# Patient Record
Sex: MALE | Race: WHITE | NOT HISPANIC OR LATINO | Employment: UNEMPLOYED | URBAN - METROPOLITAN AREA
[De-identification: names, ages, dates, MRNs, and addresses within clinical notes are randomized per-mention and may not be internally consistent; named-entity substitution may affect disease eponyms.]

---

## 2020-01-08 ENCOUNTER — HOSPITAL ENCOUNTER (EMERGENCY)
Facility: HOSPITAL | Age: 25
Discharge: PSYCHIATRIC OR SUBSTANCE USE FACILITY | End: 2020-01-08
Attending: EMERGENCY MEDICINE
Payer: MEDICARE

## 2020-01-08 VITALS
OXYGEN SATURATION: 100 % | WEIGHT: 200 LBS | RESPIRATION RATE: 18 BRPM | BODY MASS INDEX: 27.09 KG/M2 | DIASTOLIC BLOOD PRESSURE: 67 MMHG | HEART RATE: 86 BPM | TEMPERATURE: 98 F | SYSTOLIC BLOOD PRESSURE: 129 MMHG | HEIGHT: 72 IN

## 2020-01-08 DIAGNOSIS — F25.9 SCHIZOAFFECTIVE DISORDER, UNSPECIFIED TYPE: ICD-10-CM

## 2020-01-08 DIAGNOSIS — R45.850 HOMICIDAL IDEATION: Primary | ICD-10-CM

## 2020-01-08 LAB
ALBUMIN SERPL BCP-MCNC: 4.3 G/DL (ref 3.5–5.2)
ALP SERPL-CCNC: 65 U/L (ref 55–135)
ALT SERPL W/O P-5'-P-CCNC: 71 U/L (ref 10–44)
AMPHET+METHAMPHET UR QL: NORMAL
ANION GAP SERPL CALC-SCNC: 15 MMOL/L (ref 8–16)
APAP SERPL-MCNC: <3 UG/ML (ref 10–20)
AST SERPL-CCNC: 43 U/L (ref 10–40)
BACTERIA #/AREA URNS AUTO: NORMAL /HPF
BARBITURATES UR QL SCN>200 NG/ML: NEGATIVE
BASOPHILS # BLD AUTO: 0.07 K/UL (ref 0–0.2)
BASOPHILS NFR BLD: 0.4 % (ref 0–1.9)
BENZODIAZ UR QL SCN>200 NG/ML: NEGATIVE
BILIRUB SERPL-MCNC: 0.8 MG/DL (ref 0.1–1)
BILIRUB UR QL STRIP: NEGATIVE
BUN SERPL-MCNC: 21 MG/DL (ref 6–20)
BZE UR QL SCN: NORMAL
CALCIUM SERPL-MCNC: 9.6 MG/DL (ref 8.7–10.5)
CANNABINOIDS UR QL SCN: NORMAL
CHLORIDE SERPL-SCNC: 102 MMOL/L (ref 95–110)
CLARITY UR REFRACT.AUTO: CLEAR
CO2 SERPL-SCNC: 21 MMOL/L (ref 23–29)
COLOR UR AUTO: YELLOW
CREAT SERPL-MCNC: 1 MG/DL (ref 0.5–1.4)
CREAT UR-MCNC: 232 MG/DL (ref 23–375)
DIFFERENTIAL METHOD: ABNORMAL
EOSINOPHIL # BLD AUTO: 0.1 K/UL (ref 0–0.5)
EOSINOPHIL NFR BLD: 0.5 % (ref 0–8)
ERYTHROCYTE [DISTWIDTH] IN BLOOD BY AUTOMATED COUNT: 13.8 % (ref 11.5–14.5)
EST. GFR  (AFRICAN AMERICAN): >60 ML/MIN/1.73 M^2
EST. GFR  (NON AFRICAN AMERICAN): >60 ML/MIN/1.73 M^2
ETHANOL SERPL-MCNC: <10 MG/DL
GLUCOSE SERPL-MCNC: 102 MG/DL (ref 70–110)
GLUCOSE UR QL STRIP: NEGATIVE
HCT VFR BLD AUTO: 44.6 % (ref 40–54)
HGB BLD-MCNC: 14 G/DL (ref 14–18)
HGB UR QL STRIP: NEGATIVE
IMM GRANULOCYTES # BLD AUTO: 0.07 K/UL (ref 0–0.04)
IMM GRANULOCYTES NFR BLD AUTO: 0.4 % (ref 0–0.5)
KETONES UR QL STRIP: ABNORMAL
LEUKOCYTE ESTERASE UR QL STRIP: NEGATIVE
LYMPHOCYTES # BLD AUTO: 5.1 K/UL (ref 1–4.8)
LYMPHOCYTES NFR BLD: 27.5 % (ref 18–48)
MCH RBC QN AUTO: 25.7 PG (ref 27–31)
MCHC RBC AUTO-ENTMCNC: 31.4 G/DL (ref 32–36)
MCV RBC AUTO: 82 FL (ref 82–98)
METHADONE UR QL SCN>300 NG/ML: NEGATIVE
MICROSCOPIC COMMENT: NORMAL
MONOCYTES # BLD AUTO: 1.3 K/UL (ref 0.3–1)
MONOCYTES NFR BLD: 7 % (ref 4–15)
NEUTROPHILS # BLD AUTO: 12 K/UL (ref 1.8–7.7)
NEUTROPHILS NFR BLD: 64.2 % (ref 38–73)
NITRITE UR QL STRIP: NEGATIVE
NRBC BLD-RTO: 0 /100 WBC
OPIATES UR QL SCN: NEGATIVE
PCP UR QL SCN>25 NG/ML: NEGATIVE
PH UR STRIP: 5 [PH] (ref 5–8)
PLATELET # BLD AUTO: 215 K/UL (ref 150–350)
PMV BLD AUTO: 12.1 FL (ref 9.2–12.9)
POTASSIUM SERPL-SCNC: 4.1 MMOL/L (ref 3.5–5.1)
PROT SERPL-MCNC: 8 G/DL (ref 6–8.4)
PROT UR QL STRIP: NEGATIVE
RBC # BLD AUTO: 5.44 M/UL (ref 4.6–6.2)
RBC #/AREA URNS AUTO: 2 /HPF (ref 0–4)
SODIUM SERPL-SCNC: 138 MMOL/L (ref 136–145)
SP GR UR STRIP: 1.03 (ref 1–1.03)
TOXICOLOGY INFORMATION: NORMAL
TSH SERPL DL<=0.005 MIU/L-ACNC: 1.16 UIU/ML (ref 0.4–4)
URN SPEC COLLECT METH UR: ABNORMAL
WBC # BLD AUTO: 18.62 K/UL (ref 3.9–12.7)
WBC #/AREA URNS AUTO: 0 /HPF (ref 0–5)

## 2020-01-08 PROCEDURE — 85025 COMPLETE CBC W/AUTO DIFF WBC: CPT

## 2020-01-08 PROCEDURE — 80307 DRUG TEST PRSMV CHEM ANLYZR: CPT

## 2020-01-08 PROCEDURE — 80053 COMPREHEN METABOLIC PANEL: CPT

## 2020-01-08 PROCEDURE — 99285 PR EMERGENCY DEPT VISIT,LEVEL V: ICD-10-PCS | Mod: ,,, | Performed by: EMERGENCY MEDICINE

## 2020-01-08 PROCEDURE — 81001 URINALYSIS AUTO W/SCOPE: CPT | Mod: 59

## 2020-01-08 PROCEDURE — 80320 DRUG SCREEN QUANTALCOHOLS: CPT

## 2020-01-08 PROCEDURE — 80329 ANALGESICS NON-OPIOID 1 OR 2: CPT

## 2020-01-08 PROCEDURE — 99285 EMERGENCY DEPT VISIT HI MDM: CPT | Mod: ,,, | Performed by: EMERGENCY MEDICINE

## 2020-01-08 PROCEDURE — 99285 EMERGENCY DEPT VISIT HI MDM: CPT | Mod: 25

## 2020-01-08 PROCEDURE — 84443 ASSAY THYROID STIM HORMONE: CPT

## 2020-01-08 PROCEDURE — 25000003 PHARM REV CODE 250: Performed by: EMERGENCY MEDICINE

## 2020-01-08 RX ORDER — TALC
6 POWDER (GRAM) TOPICAL NIGHTLY PRN
Status: DISCONTINUED | OUTPATIENT
Start: 2020-01-08 | End: 2020-01-08 | Stop reason: HOSPADM

## 2020-01-08 RX ADMIN — Medication 6 MG: at 03:01

## 2020-01-08 NOTE — ED PROVIDER NOTES
"Source of History:  Patient, OPC, mother    Chief complaint:  Psychiatric Evaluation (brought in with [facility] with OPC. per OPC states patient was threatening to kill mother and sibling. pt denies SI/HI at present. pt states he went voluntarily to [facility] but left because "wait was too long")    HPI:  Zack Bhatia is a 24 y.o. male with history of substance abuse, schizoaffective disorder, opioid abuse, impulse control disorder presenting to emergency department under OPC order for threatening to kill his mother.    I spoke with the patient's mother by phone.  She states that this evening the patient busted into the house where he is not supposed to be, stating I am going to kill you.  He also threatened to kill his little brother.  Patient's mother states that he has been acting erratically.  He missed his December injection of medication due to having the flu.  Per the OPC, he abuses marijuana, synthetic marijuana.     At this time the patient has no complaints except that he states that he is tired.  He denies wanting to hurt himself or others, denies any hallucinations, denies paranoia.  He states that he has been using drugs but states I prefer not to stay what.    ROS: As per HPI and below:  Review of Systems   Constitutional: Negative for fever.   HENT: Negative for sore throat.    Eyes: Negative for double vision.   Respiratory: Negative for cough and shortness of breath.    Cardiovascular: Negative for chest pain.   Gastrointestinal: Negative for abdominal pain and vomiting.   Genitourinary: Negative for dysuria.   Musculoskeletal: Negative for falls.   Skin: Negative for rash.   Neurological: Negative for headaches.   Psychiatric/Behavioral: Positive for substance abuse.     Review of patient's allergies indicates:   Allergen Reactions    Thorazine [chlorpromazine]      "get overheated when I go outside after taking"       No current facility-administered medications on file prior to encounter.  "     Current Outpatient Medications on File Prior to Encounter   Medication Sig Dispense Refill    benztropine (COGENTIN) 0.5 MG tablet Take 0.5 mg by mouth 2 (two) times daily.      chlorproMAZINE (THORAZINE) 200 MG tablet Take 200 mg by mouth 3 (three) times daily.      divalproex (DEPAKOTE) 125 MG EC tablet Take 125 mg by mouth 3 (three) times daily.      haloperidol (HALDOL) 10 MG tablet Take 10 mg by mouth 4 (four) times daily.      hydrOXYzine (VISTARIL) 100 MG capsule Take 100 mg by mouth 4 (four) times daily.      lithium carbonate 150 MG capsule Take 300 mg by mouth 3 (three) times daily with meals.       quetiapine (SEROQUEL) 400 MG tablet Take 1 tablet (400 mg total) by mouth every evening. 30 tablet 1    risperidone (RISPERDAL) 1 MG tablet Take 1 mg by mouth 2 (two) times daily.         PMH:  As per HPI and below:  Past Medical History:   Diagnosis Date    Bipolar 1 disorder     Hepatitis C     History of psychiatric care     History of psychiatric hospitalization     Psychiatric exam requested by authority     Psychiatric problem     Schizo affective schizophrenia     Therapy      Past Surgical History:   Procedure Laterality Date    ADENOIDECTOMY      EYE SURGERY      KNEE SURGERY      TONSILLECTOMY         Social History     Socioeconomic History    Marital status: Single     Spouse name: Not on file    Number of children: Not on file    Years of education: Not on file    Highest education level: Not on file   Occupational History    Not on file   Social Needs    Financial resource strain: Not on file    Food insecurity:     Worry: Not on file     Inability: Not on file    Transportation needs:     Medical: Not on file     Non-medical: Not on file   Tobacco Use    Smoking status: Current Every Day Smoker     Packs/day: 0.25     Years: 3.00     Pack years: 0.75     Types: Cigarettes   Substance and Sexual Activity    Alcohol use: No    Drug use: Yes     Types: Marijuana,  Cocaine     Comment: heroin    Sexual activity: Never   Lifestyle    Physical activity:     Days per week: Not on file     Minutes per session: Not on file    Stress: Not on file   Relationships    Social connections:     Talks on phone: Not on file     Gets together: Not on file     Attends Yarsani service: Not on file     Active member of club or organization: Not on file     Attends meetings of clubs or organizations: Not on file     Relationship status: Not on file   Other Topics Concern    Patient feels they ought to cut down on drinking/drug use No    Patient annoyed by others criticizing their drinking/drug use No    Patient has felt bad or guilty about drinking/drug use No    Patient has had a drink/used drugs as an eye opener in the AM No   Social History Narrative    Not on file       Family History   Problem Relation Age of Onset    Heart disease Mother     Diabetes Maternal Aunt        Physical Exam:    Vitals:    01/08/20 0030   BP: (!) 162/83   Pulse: 102   Resp: 18   Temp: 98.3 °F (36.8 °C)     Gen: No acute distress.  Mental Status:  Alert and oriented x 3.  Appropriate, conversant.  Skin: Warm, dry. No rashes seen.  Eyes: No conjunctival injection.  ENT: Oropharynx clear.    Pulm: Clear to auscultation bilaterally.  Good air movement.  No wheezes. No increased work of breathing.  CV: Regular rate. Regular rhythm. Normal peripheral perfusion. No lower extremity edema.  Abd: Soft.  Not distended.  Nontender.  No guarding.  No rebound.  MSK: Good range of motion all joints.  No deformities.    Neck supple.  No meningismus.  Neuro: Awake. Speech normal. No focal neuro deficit observed. Cranial nerves intact. Motor grossly intact.  Sensation grossly intact.  Cerebellar intact.      Laboratory Studies:  Labs Reviewed   CBC W/ AUTO DIFFERENTIAL - Abnormal; Notable for the following components:       Result Value    WBC 18.62 (*)     Mean Corpuscular Hemoglobin 25.7 (*)     Mean Corpuscular  Hemoglobin Conc 31.4 (*)     All other components within normal limits   COMPREHENSIVE METABOLIC PANEL - Abnormal; Notable for the following components:    CO2 21 (*)     BUN, Bld 21 (*)     AST 43 (*)     ALT 71 (*)     All other components within normal limits   URINALYSIS, REFLEX TO URINE CULTURE - Abnormal; Notable for the following components:    Ketones, UA 3+ (*)     All other components within normal limits    Narrative:     Preferred Collection Type->Urine, Clean Catch   ACETAMINOPHEN LEVEL - Abnormal; Notable for the following components:    Acetaminophen (Tylenol), Serum <3.0 (*)     All other components within normal limits   TSH   DRUG SCREEN PANEL, URINE EMERGENCY    Narrative:     Preferred Collection Type->Urine, Clean Catch   ALCOHOL,MEDICAL (ETHANOL)   URINALYSIS MICROSCOPIC    Narrative:     Preferred Collection Type->Urine, Clean Catch     Chart reviewed.  Psychiatric history requiring hospitalization in the past.    Imaging Results    None       Medications Given:  Medications - No data to display    MDM:    24 y.o. male brought to emergency department under OPC for threatening to kill his mother and brother.  He is afebrile, stable, nontoxic.  He is neuro intact.  No signs of trauma.    Psychiatric medical screening was completed, found have a leukocytosis which appears to be somewhat chronic and is likely reactive due to his polysubstance abuse.  His urine toxicology is positive for cocaine, amphetamines, and THC.    Additional workup is unremarkable.  Patient is medically clear for transfer to psychiatric hospital.    Diagnostic Impression:    1. Homicidal ideation    2. Schizoaffective disorder, unspecified type        Patient and/or family understands the plan and is in agreement, verbalized understanding, questions answered    Valeri Lopez MD  Emergency Medicine           Valeri Lopez MD  01/08/20 8646

## 2020-01-08 NOTE — ED NOTES
"Historian reports pt being seen at another facility tonight for a psych eval and was discharged. Pt's mother was not satisfied with that and contacted a  and the police and pt is here for another psych eval. Pt reports using recreational drugs tonight but refuses to say which ones. Pt states, "it's in the urine. I don't feel like talking about which ones." pt denies any SI or HI and denies any past attempts. No acute distress noted. Stable condition. Sitter and police at bedside.    "
Assumed pt care, pt is hyperactive but cooperative. Pt instructed on the PEC process and placement. Pt verbalizes understanding. Pt informed that he will be transferring [place] [facility]   
Bed: 20  Expected date: 1/8/20  Expected time: 12:28 AM  Means of arrival:   Comments:  OPC  
Nurse Fofana with [facility] given report.   
Patient transferred to [facility] with security and myself with 1 bag of belongings. Patient searched for contraband.  called and informed of transfer to [facility]. DVC maintained.   
Patient with erratic fast movements  . He denies SI, HI, V/A hallucinations. Behavior Bizarre . DVC maintained.   
visualized patient  
Soft, non-tender, no hepatosplenomegaly, normal bowel sounds
Soft, non-tender, no hepatosplenomegaly, normal bowel sounds

## 2020-02-10 ENCOUNTER — HOSPITAL ENCOUNTER (EMERGENCY)
Facility: HOSPITAL | Age: 25
Discharge: PSYCHIATRIC OR SUBSTANCE USE FACILITY | End: 2020-02-11
Attending: EMERGENCY MEDICINE
Payer: MEDICARE

## 2020-02-10 DIAGNOSIS — R45.851 SUICIDAL IDEATION: Primary | ICD-10-CM

## 2020-02-10 LAB
ALBUMIN SERPL BCP-MCNC: 4 G/DL (ref 3.5–5.2)
ALP SERPL-CCNC: 51 U/L (ref 55–135)
ALT SERPL W/O P-5'-P-CCNC: 56 U/L (ref 10–44)
AMPHET+METHAMPHET UR QL: NEGATIVE
ANION GAP SERPL CALC-SCNC: 11 MMOL/L (ref 8–16)
ANISOCYTOSIS BLD QL SMEAR: ABNORMAL
AST SERPL-CCNC: 34 U/L (ref 10–40)
BARBITURATES UR QL SCN>200 NG/ML: NEGATIVE
BASOPHILS # BLD AUTO: 0.06 K/UL (ref 0–0.2)
BASOPHILS NFR BLD: 0.4 % (ref 0–1.9)
BENZODIAZ UR QL SCN>200 NG/ML: NEGATIVE
BILIRUB SERPL-MCNC: 0.3 MG/DL (ref 0.1–1)
BILIRUB UR QL STRIP: NEGATIVE
BUN SERPL-MCNC: 23 MG/DL (ref 6–20)
BZE UR QL SCN: NEGATIVE
CALCIUM SERPL-MCNC: 9.2 MG/DL (ref 8.7–10.5)
CANNABINOIDS UR QL SCN: NORMAL
CHLORIDE SERPL-SCNC: 107 MMOL/L (ref 95–110)
CLARITY UR REFRACT.AUTO: CLEAR
CO2 SERPL-SCNC: 22 MMOL/L (ref 23–29)
COLOR UR AUTO: YELLOW
CREAT SERPL-MCNC: 0.9 MG/DL (ref 0.5–1.4)
CREAT UR-MCNC: 112 MG/DL (ref 23–375)
DIFFERENTIAL METHOD: ABNORMAL
EOSINOPHIL # BLD AUTO: 0.2 K/UL (ref 0–0.5)
EOSINOPHIL NFR BLD: 0.9 % (ref 0–8)
ERYTHROCYTE [DISTWIDTH] IN BLOOD BY AUTOMATED COUNT: 14.3 % (ref 11.5–14.5)
EST. GFR  (AFRICAN AMERICAN): >60 ML/MIN/1.73 M^2
EST. GFR  (NON AFRICAN AMERICAN): >60 ML/MIN/1.73 M^2
ETHANOL SERPL-MCNC: <10 MG/DL
GLUCOSE SERPL-MCNC: 99 MG/DL (ref 70–110)
GLUCOSE UR QL STRIP: NEGATIVE
HCT VFR BLD AUTO: 40.9 % (ref 40–54)
HGB BLD-MCNC: 13.1 G/DL (ref 14–18)
HGB UR QL STRIP: NEGATIVE
HYPOCHROMIA BLD QL SMEAR: ABNORMAL
IMM GRANULOCYTES # BLD AUTO: 0.05 K/UL (ref 0–0.04)
IMM GRANULOCYTES NFR BLD AUTO: 0.3 % (ref 0–0.5)
KETONES UR QL STRIP: NEGATIVE
LEUKOCYTE ESTERASE UR QL STRIP: NEGATIVE
LYMPHOCYTES # BLD AUTO: 4.8 K/UL (ref 1–4.8)
LYMPHOCYTES NFR BLD: 29.4 % (ref 18–48)
MCH RBC QN AUTO: 26 PG (ref 27–31)
MCHC RBC AUTO-ENTMCNC: 32 G/DL (ref 32–36)
MCV RBC AUTO: 81 FL (ref 82–98)
METHADONE UR QL SCN>300 NG/ML: NEGATIVE
MONOCYTES # BLD AUTO: 1.3 K/UL (ref 0.3–1)
MONOCYTES NFR BLD: 7.8 % (ref 4–15)
NEUTROPHILS # BLD AUTO: 9.9 K/UL (ref 1.8–7.7)
NEUTROPHILS NFR BLD: 61.2 % (ref 38–73)
NITRITE UR QL STRIP: NEGATIVE
NRBC BLD-RTO: 0 /100 WBC
OPIATES UR QL SCN: NEGATIVE
PCP UR QL SCN>25 NG/ML: NEGATIVE
PH UR STRIP: 6 [PH] (ref 5–8)
PLATELET # BLD AUTO: 193 K/UL (ref 150–350)
PLATELET BLD QL SMEAR: ABNORMAL
PMV BLD AUTO: 12.4 FL (ref 9.2–12.9)
POLYCHROMASIA BLD QL SMEAR: ABNORMAL
POTASSIUM SERPL-SCNC: 3.8 MMOL/L (ref 3.5–5.1)
PROT SERPL-MCNC: 7.3 G/DL (ref 6–8.4)
PROT UR QL STRIP: NEGATIVE
RBC # BLD AUTO: 5.03 M/UL (ref 4.6–6.2)
SODIUM SERPL-SCNC: 140 MMOL/L (ref 136–145)
SP GR UR STRIP: 1.02 (ref 1–1.03)
TOXIC GRANULES BLD QL SMEAR: PRESENT
TOXICOLOGY INFORMATION: NORMAL
TSH SERPL DL<=0.005 MIU/L-ACNC: 1.61 UIU/ML (ref 0.4–4)
URN SPEC COLLECT METH UR: NORMAL
WBC # BLD AUTO: 16.2 K/UL (ref 3.9–12.7)

## 2020-02-10 PROCEDURE — 85025 COMPLETE CBC W/AUTO DIFF WBC: CPT

## 2020-02-10 PROCEDURE — 80307 DRUG TEST PRSMV CHEM ANLYZR: CPT

## 2020-02-10 PROCEDURE — 99285 EMERGENCY DEPT VISIT HI MDM: CPT | Mod: ,,, | Performed by: EMERGENCY MEDICINE

## 2020-02-10 PROCEDURE — 81003 URINALYSIS AUTO W/O SCOPE: CPT | Mod: 59

## 2020-02-10 PROCEDURE — 99285 PR EMERGENCY DEPT VISIT,LEVEL V: ICD-10-PCS | Mod: ,,, | Performed by: EMERGENCY MEDICINE

## 2020-02-10 PROCEDURE — 80320 DRUG SCREEN QUANTALCOHOLS: CPT

## 2020-02-10 PROCEDURE — 80053 COMPREHEN METABOLIC PANEL: CPT

## 2020-02-10 PROCEDURE — 84443 ASSAY THYROID STIM HORMONE: CPT

## 2020-02-10 PROCEDURE — 25000003 PHARM REV CODE 250: Performed by: PHYSICIAN ASSISTANT

## 2020-02-10 PROCEDURE — 99285 EMERGENCY DEPT VISIT HI MDM: CPT

## 2020-02-10 RX ORDER — QUETIAPINE FUMARATE 200 MG/1
400 TABLET, FILM COATED ORAL
Status: COMPLETED | OUTPATIENT
Start: 2020-02-10 | End: 2020-02-10

## 2020-02-10 RX ORDER — ALPRAZOLAM 0.5 MG/1
0.5 TABLET ORAL
Status: COMPLETED | OUTPATIENT
Start: 2020-02-10 | End: 2020-02-10

## 2020-02-10 RX ORDER — QUETIAPINE FUMARATE 300 MG/1
300 TABLET, FILM COATED ORAL
Status: DISCONTINUED | OUTPATIENT
Start: 2020-02-10 | End: 2020-02-10

## 2020-02-10 RX ORDER — QUETIAPINE FUMARATE 200 MG/1
400 TABLET, FILM COATED ORAL
Status: DISCONTINUED | OUTPATIENT
Start: 2020-02-10 | End: 2020-02-10

## 2020-02-10 RX ADMIN — QUETIAPINE FUMARATE 400 MG: 200 TABLET ORAL at 09:02

## 2020-02-10 RX ADMIN — ALPRAZOLAM 0.5 MG: 0.5 TABLET ORAL at 08:02

## 2020-02-11 VITALS
DIASTOLIC BLOOD PRESSURE: 67 MMHG | WEIGHT: 202.38 LBS | HEART RATE: 88 BPM | SYSTOLIC BLOOD PRESSURE: 121 MMHG | OXYGEN SATURATION: 100 % | BODY MASS INDEX: 29.98 KG/M2 | TEMPERATURE: 98 F | HEIGHT: 69 IN | RESPIRATION RATE: 18 BRPM

## 2020-02-11 NOTE — ED NOTES
The room has been made safe for pt arrival. All wires, cords, bins and cleaning supplies have been removed from the room. Sitter is at the bedside with no personal belongings to provide direct visual observation and q15 minute checks. The patient has been searched for harmful objects and changed into paper scrubs as per protocol. Pt belongings have been removed from the room, labelled in a bag and secured in pt belongings closet. The pt has been given an explanation of pt rights while hospitalized. Pt has signed acknowledgement of those rights and a copy has been placed in the chart.

## 2020-02-11 NOTE — ED NOTES
Pt reports +SI after verbal altercation with mother. Pt calm and cooperative with depressed state. Pt denies HI, A/V hallucinations. Pt reports compliance with medications.

## 2020-02-11 NOTE — ED NOTES
Patient transferred to [facility] with ER nurse and security with belongings. Patient searched for contraband.  called and informed of transfer to  3. Spoke with Madelin with the . Patient denies SI, HI, V/A hallucinations. DVC maintained.

## 2020-02-11 NOTE — ED PROVIDER NOTES
"Encounter Date: 2/10/2020       History     Chief Complaint   Patient presents with    Suicidal     Pt states "I'm fighting with my mother and I'm suicidal. I missed my invega shot."       24-year-old male to the ER for evaluation of suicidal ideation.  The patient has a longstanding history of bipolar disorder and major depressive disorder.  The patient was recently hospitalized at [facility].  He presents to the ER tonight complaining of suicidal ideations stating  That he wants to kill himself after a fight with his mother.  He reports the last Invega injection was January 9th.  He reports being a his Seroquel and gabapentin.  He appears calm and cooperative.  There are no signs of bodily injury or trauma.        Review of patient's allergies indicates:   Allergen Reactions    Thorazine [chlorpromazine]      "get overheated when I go outside after taking"     Past Medical History:   Diagnosis Date    Bipolar 1 disorder     Hepatitis C     History of psychiatric care     History of psychiatric hospitalization     Psychiatric exam requested by authority     Psychiatric problem     Schizo affective schizophrenia     Therapy      Past Surgical History:   Procedure Laterality Date    ADENOIDECTOMY      EYE SURGERY      KNEE SURGERY      TONSILLECTOMY       Family History   Problem Relation Age of Onset    Heart disease Mother     Diabetes Maternal Aunt      Social History     Tobacco Use    Smoking status: Current Every Day Smoker     Packs/day: 0.25     Years: 3.00     Pack years: 0.75     Types: Cigarettes   Substance Use Topics    Alcohol use: No    Drug use: Yes     Types: Marijuana, Cocaine     Comment: heroin     Review of Systems   Constitutional: Negative for fever.   HENT: Negative for sore throat.    Respiratory: Negative for shortness of breath.    Cardiovascular: Negative for chest pain.   Gastrointestinal: Negative for nausea.   Genitourinary: Negative for " dysuria.   Musculoskeletal: Negative for back pain.   Skin: Negative for rash.   Neurological: Negative for weakness.   Hematological: Does not bruise/bleed easily.   Psychiatric/Behavioral: Positive for sleep disturbance and suicidal ideas. Negative for self-injury. The patient is nervous/anxious.        Physical Exam     Initial Vitals [02/10/20 2010]   BP Pulse Resp Temp SpO2   132/77 100 16 98.7 °F (37.1 °C) 96 %      MAP       --         Physical Exam    Constitutional: Vital signs are normal. He appears well-developed and well-nourished. He is not diaphoretic. No distress.   HENT:   Head: Normocephalic and atraumatic.   Right Ear: Hearing and external ear normal.   Left Ear: Hearing and external ear normal.   Eyes: Conjunctivae are normal.   Cardiovascular: Normal rate and regular rhythm. Exam reveals no gallop and no friction rub.    No murmur heard.  Pulmonary/Chest: No respiratory distress. He has no wheezes. He has no rhonchi. He has no rales. He exhibits no tenderness.   Abdominal: Soft. Normal appearance and bowel sounds are normal. He exhibits no distension and no mass. There is no tenderness. There is no rebound and no guarding.   Musculoskeletal: Normal range of motion.   Neurological: He is alert and oriented to person, place, and time.   Awake alert and oriented with appropriate mentation   Skin: Skin is warm and intact.   Psychiatric: His speech is normal and behavior is normal. Thought content is not paranoid and not delusional. Cognition and memory are normal. He exhibits a depressed mood. He expresses suicidal ideation. He expresses suicidal plans.         ED Course   Procedures  Labs Reviewed   CBC W/ AUTO DIFFERENTIAL - Abnormal; Notable for the following components:       Result Value    WBC 16.20 (*)     Hemoglobin 13.1 (*)     Mean Corpuscular Volume 81 (*)     Mean Corpuscular Hemoglobin 26.0 (*)     Gran # (ANC) 9.9 (*)     Immature Grans (Abs) 0.05 (*)     Mono # 1.3 (*)     All other  components within normal limits   COMPREHENSIVE METABOLIC PANEL - Abnormal; Notable for the following components:    CO2 22 (*)     BUN, Bld 23 (*)     Alkaline Phosphatase 51 (*)     ALT 56 (*)     All other components within normal limits   TSH   URINALYSIS, REFLEX TO URINE CULTURE    Narrative:     Preferred Collection Type->Urine, Clean Catch   ALCOHOL,MEDICAL (ETHANOL)   DRUG SCREEN PANEL, URINE EMERGENCY          Imaging Results    None          Medical Decision Making:   Initial Assessment:     24-year-old male with suicidal ideations  Differential Diagnosis:    Substance induced mood disorder, alcohol induced mood disorder, electrolyte derangement, suicidal, manic behavior  Clinical Tests:   Lab Tests: Ordered and Reviewed  ED Management:  24-year-old male presenting with suicidal ideations.  The patient has been medically cleared in the ER.  He has been placed under a PEC and will be transferred out for mental health placement and evaluation  Other:   I discussed test(s) with the performing physician.  I have discussed this case with another health care provider.                             Medically cleared for psychiatry placement: 2/10/2020 10:57 PM    Clinical Impression:       ICD-10-CM ICD-9-CM   1. Suicidal ideation R45.851 V62.84         Disposition:   Disposition: Transferred  Condition: Stable                     Tray Haines PA-C  02/10/20 2300

## 2020-02-11 NOTE — ED NOTES
Patient transferred to [facility] by SPD with 3 bags of belongings with Valuable envelope in 1 of the bags. Security present for transfer. Nurse Elaine with [facility] given report. Vital stable. PEC sent with Patient. DVC maintained. Patient did not want contact person called.

## 2020-03-17 ENCOUNTER — HOSPITAL ENCOUNTER (EMERGENCY)
Facility: HOSPITAL | Age: 25
Discharge: HOME | End: 2020-03-17
Attending: EMERGENCY MEDICINE
Payer: MEDICARE

## 2020-03-17 VITALS — TEMPERATURE: 98 F | DIASTOLIC BLOOD PRESSURE: 82 MMHG | SYSTOLIC BLOOD PRESSURE: 136 MMHG

## 2020-03-17 DIAGNOSIS — T50.901A ACCIDENTAL OVERDOSE, INITIAL ENCOUNTER: Primary | ICD-10-CM

## 2020-03-17 PROCEDURE — 99283 EMERGENCY DEPT VISIT LOW MDM: CPT

## 2020-03-17 RX ORDER — NALOXONE HYDROCHLORIDE 1 MG/ML
INJECTION INTRAMUSCULAR; INTRAVENOUS; SUBCUTANEOUS
Qty: 2 ML | Refills: 0 | Status: ON HOLD | OUTPATIENT
Start: 2020-03-17 | End: 2023-06-05 | Stop reason: HOSPADM

## 2020-03-17 NOTE — ED NOTES
Unable to assess patient, Pt spent time in the restroom and demanded to leave hospital immediately. PT discharged AMA by MD

## 2020-03-17 NOTE — ED PROVIDER NOTES
"Encounter Date: 3/17/2020    SCRIBE #1 NOTE: I, Calvin Castorena, am scribing for, and in the presence of, Jake Ramos MD.       History     Chief Complaint   Patient presents with    Drug Overdose     Patient was found behind a convenient store unresponsive.  Manfred WHITT administered 4 mg of Narcan.  Patient is awake and alert at this time.     24-year-old male brought to the emergency department by EMS after he was found unconscious.  Initial responders report patient was not breathing very much and was administered Narcan IM and intranasally.  They report good response.  On arrival, patient has no complaints.  Admits to using some heroin.  Denies any chest pain, shortness of breath, cough, congestion, fever, headache, lightheadedness, dizziness, nausea, vomiting, or any pain of any kind.    The history is provided by the patient and the EMS personnel.     Review of patient's allergies indicates:   Allergen Reactions    Thorazine [chlorpromazine]      "get overheated when I go outside after taking"     Past Medical History:   Diagnosis Date    Bipolar 1 disorder     Hepatitis C     History of psychiatric care     History of psychiatric hospitalization     Psychiatric exam requested by authority     Psychiatric problem     Schizo affective schizophrenia     Therapy      Past Surgical History:   Procedure Laterality Date    ADENOIDECTOMY      EYE SURGERY      KNEE SURGERY      TONSILLECTOMY       Family History   Problem Relation Age of Onset    Heart disease Mother     Diabetes Maternal Aunt      Social History     Tobacco Use    Smoking status: Current Every Day Smoker     Packs/day: 0.25     Years: 3.00     Pack years: 0.75     Types: Cigarettes   Substance Use Topics    Alcohol use: No    Drug use: Yes     Types: Marijuana, Cocaine     Comment: heroin     Review of Systems   Constitutional: Negative for chills, fatigue and fever.   HENT: Negative for facial swelling, trouble swallowing and voice " change.    Eyes: Negative for photophobia, pain and redness.   Respiratory: Negative for cough, choking and shortness of breath.    Cardiovascular: Negative for chest pain, palpitations and leg swelling.   Gastrointestinal: Negative for abdominal pain, diarrhea, nausea and vomiting.   Genitourinary: Negative for dysuria, frequency and urgency.   Musculoskeletal: Negative for back pain, neck pain and neck stiffness.   Neurological: Negative for seizures, speech difficulty, light-headedness, numbness and headaches.   All other systems reviewed and are negative.      Physical Exam     Initial Vitals   BP Pulse Resp Temp SpO2   -- -- -- -- --      MAP       --         Physical Exam    Nursing note and vitals reviewed.  Constitutional: He appears well-developed and well-nourished. No distress.   HENT:   Head: Normocephalic and atraumatic.   Oropharynx clear; Dry MM    Eyes: Conjunctivae and EOM are normal. Pupils are equal, round, and reactive to light.   Neck: Normal range of motion. Neck supple. No tracheal deviation present.   Cardiovascular: Normal rate, regular rhythm, normal heart sounds and intact distal pulses.   Pulmonary/Chest: Breath sounds normal. No respiratory distress. He has no wheezes. He has no rhonchi. He has no rales.   Abdominal: Soft. Bowel sounds are normal. He exhibits no distension. There is no tenderness.   Musculoskeletal: Normal range of motion. He exhibits no edema or tenderness.   Neurological: He is alert and oriented to person, place, and time. He has normal strength. No cranial nerve deficit.   Skin: Skin is warm and dry.         ED Course   Procedures  Labs Reviewed - No data to display       Imaging Results    None          Medical Decision Making:   Initial Assessment:   24-year-old male presents emergency department after an accidental heroin overdose  ED Management:  5:24 PM: Pt is A&O x3, appropriate and competent at this time. Pt voices desire to leave hospital. D/w pt in length  need for further evaluation and treatment due to HPI and PEx. Pt declines any further evaluation or tx at this time. All risks, including worsening sx, permanent bodily harm and death, were discussed in length. Pt acknowledges all risk at this time and agrees to sign AMA form. Pt given return to ER instructions. All questions and concerns addressed at this time. Pt leaving AMA.                                   Clinical Impression:       ICD-10-CM ICD-9-CM   1. Accidental overdose, initial encounter T50.901A 977.9     E858.9           Disposition:   Disposition: AMA         I, Dr. Jake Ramos, personally performed the services described in this documentation. All medical record entries made by the scribe were at my direction and in my presence.  I have reviewed the chart and agree that the record reflects my personal performance and is accurate and complete. Jake Ramos MD.  5:53 PM 03/17/2020                 Jake Ramos MD  03/17/20 [id number]

## 2020-06-20 ENCOUNTER — HOSPITAL ENCOUNTER (EMERGENCY)
Facility: HOSPITAL | Age: 25
Discharge: PSYCHIATRIC OR SUBSTANCE USE FACILITY | End: 2020-06-20
Attending: EMERGENCY MEDICINE
Payer: MEDICARE

## 2020-06-20 VITALS
RESPIRATION RATE: 18 BRPM | HEART RATE: 82 BPM | OXYGEN SATURATION: 96 % | TEMPERATURE: 99 F | SYSTOLIC BLOOD PRESSURE: 144 MMHG | DIASTOLIC BLOOD PRESSURE: 77 MMHG

## 2020-06-20 DIAGNOSIS — R45.851 SUICIDAL IDEATION: Primary | ICD-10-CM

## 2020-06-20 LAB
ALBUMIN SERPL BCP-MCNC: 3.7 G/DL (ref 3.5–5.2)
ALP SERPL-CCNC: 56 U/L (ref 55–135)
ALT SERPL W/O P-5'-P-CCNC: 45 U/L (ref 10–44)
AMPHET+METHAMPHET UR QL: NEGATIVE
ANION GAP SERPL CALC-SCNC: 8 MMOL/L (ref 8–16)
APAP SERPL-MCNC: <3 UG/ML (ref 10–20)
AST SERPL-CCNC: 28 U/L (ref 10–40)
BACTERIA #/AREA URNS AUTO: NORMAL /HPF
BARBITURATES UR QL SCN>200 NG/ML: NEGATIVE
BASOPHILS # BLD AUTO: 0.06 K/UL (ref 0–0.2)
BASOPHILS NFR BLD: 0.4 % (ref 0–1.9)
BENZODIAZ UR QL SCN>200 NG/ML: NEGATIVE
BILIRUB SERPL-MCNC: 0.2 MG/DL (ref 0.1–1)
BILIRUB UR QL STRIP: NEGATIVE
BUN SERPL-MCNC: 19 MG/DL (ref 6–20)
BZE UR QL SCN: NORMAL
CALCIUM SERPL-MCNC: 8.8 MG/DL (ref 8.7–10.5)
CANNABINOIDS UR QL SCN: NORMAL
CHLORIDE SERPL-SCNC: 107 MMOL/L (ref 95–110)
CLARITY UR REFRACT.AUTO: CLEAR
CO2 SERPL-SCNC: 22 MMOL/L (ref 23–29)
COLOR UR AUTO: YELLOW
CREAT SERPL-MCNC: 0.9 MG/DL (ref 0.5–1.4)
CREAT UR-MCNC: 167 MG/DL (ref 23–375)
DIFFERENTIAL METHOD: ABNORMAL
EOSINOPHIL # BLD AUTO: 0.1 K/UL (ref 0–0.5)
EOSINOPHIL NFR BLD: 1 % (ref 0–8)
ERYTHROCYTE [DISTWIDTH] IN BLOOD BY AUTOMATED COUNT: 14.1 % (ref 11.5–14.5)
EST. GFR  (AFRICAN AMERICAN): >60 ML/MIN/1.73 M^2
EST. GFR  (NON AFRICAN AMERICAN): >60 ML/MIN/1.73 M^2
ETHANOL SERPL-MCNC: <10 MG/DL
GLUCOSE SERPL-MCNC: 98 MG/DL (ref 70–110)
GLUCOSE UR QL STRIP: NEGATIVE
HCT VFR BLD AUTO: 40.3 % (ref 40–54)
HGB BLD-MCNC: 12.8 G/DL (ref 14–18)
HGB UR QL STRIP: NEGATIVE
IMM GRANULOCYTES # BLD AUTO: 0.03 K/UL (ref 0–0.04)
IMM GRANULOCYTES NFR BLD AUTO: 0.2 % (ref 0–0.5)
KETONES UR QL STRIP: NEGATIVE
LEUKOCYTE ESTERASE UR QL STRIP: NEGATIVE
LYMPHOCYTES # BLD AUTO: 4.9 K/UL (ref 1–4.8)
LYMPHOCYTES NFR BLD: 36.1 % (ref 18–48)
MCH RBC QN AUTO: 26 PG (ref 27–31)
MCHC RBC AUTO-ENTMCNC: 31.8 G/DL (ref 32–36)
MCV RBC AUTO: 82 FL (ref 82–98)
METHADONE UR QL SCN>300 NG/ML: NEGATIVE
MICROSCOPIC COMMENT: NORMAL
MONOCYTES # BLD AUTO: 1.4 K/UL (ref 0.3–1)
MONOCYTES NFR BLD: 10.5 % (ref 4–15)
NEUTROPHILS # BLD AUTO: 7 K/UL (ref 1.8–7.7)
NEUTROPHILS NFR BLD: 51.8 % (ref 38–73)
NITRITE UR QL STRIP: NEGATIVE
NRBC BLD-RTO: 0 /100 WBC
OPIATES UR QL SCN: NEGATIVE
PCP UR QL SCN>25 NG/ML: NEGATIVE
PH UR STRIP: 6 [PH] (ref 5–8)
PLATELET # BLD AUTO: 188 K/UL (ref 150–350)
PLATELET BLD QL SMEAR: ABNORMAL
PMV BLD AUTO: 12.8 FL (ref 9.2–12.9)
POTASSIUM SERPL-SCNC: 3.7 MMOL/L (ref 3.5–5.1)
PROT SERPL-MCNC: 6.8 G/DL (ref 6–8.4)
PROT UR QL STRIP: NEGATIVE
RBC # BLD AUTO: 4.93 M/UL (ref 4.6–6.2)
RBC #/AREA URNS AUTO: 1 /HPF (ref 0–4)
SARS-COV-2 RDRP RESP QL NAA+PROBE: NEGATIVE
SODIUM SERPL-SCNC: 137 MMOL/L (ref 136–145)
SP GR UR STRIP: 1.02 (ref 1–1.03)
TOXICOLOGY INFORMATION: NORMAL
TSH SERPL DL<=0.005 MIU/L-ACNC: 2.35 UIU/ML (ref 0.4–4)
URN SPEC COLLECT METH UR: NORMAL
WBC # BLD AUTO: 13.46 K/UL (ref 3.9–12.7)
WBC #/AREA URNS AUTO: 0 /HPF (ref 0–5)

## 2020-06-20 PROCEDURE — 80053 COMPREHEN METABOLIC PANEL: CPT

## 2020-06-20 PROCEDURE — 84443 ASSAY THYROID STIM HORMONE: CPT

## 2020-06-20 PROCEDURE — 80329 ANALGESICS NON-OPIOID 1 OR 2: CPT

## 2020-06-20 PROCEDURE — U0002 COVID-19 LAB TEST NON-CDC: HCPCS

## 2020-06-20 PROCEDURE — 80320 DRUG SCREEN QUANTALCOHOLS: CPT

## 2020-06-20 PROCEDURE — 85025 COMPLETE CBC W/AUTO DIFF WBC: CPT

## 2020-06-20 PROCEDURE — 80307 DRUG TEST PRSMV CHEM ANLYZR: CPT

## 2020-06-20 PROCEDURE — 81001 URINALYSIS AUTO W/SCOPE: CPT | Mod: 59

## 2020-06-20 PROCEDURE — 99285 EMERGENCY DEPT VISIT HI MDM: CPT

## 2020-06-20 PROCEDURE — 99284 PR EMERGENCY DEPT VISIT,LEVEL IV: ICD-10-PCS | Mod: ,,, | Performed by: EMERGENCY MEDICINE

## 2020-06-20 PROCEDURE — 99284 EMERGENCY DEPT VISIT MOD MDM: CPT | Mod: ,,, | Performed by: EMERGENCY MEDICINE

## 2020-06-20 NOTE — ED PROVIDER NOTES
"Source of History:  Patient    Chief complaint:  Psychiatric Evaluation (SI & HI x1 day. Denies hallucinations. Hx schizophrenia with psych admission. Brought to ED by JPUVALDO.)      HPI:  Zack Bhatia is a 24 y.o. male presenting with suicidal ideation.  Patient states that he has a history schizophrenia has not taken his medications, he is unsure how long it has been.  He states he had several drugs today but is unsure what they were.  He states he is suicidal and homicidal against myself patient is a poor historian and unable to give me more info.  Denies active hallucinations now.    ROS: As per HPI and below:    General: No fever.  No chills.  Eyes: No visual changes.  Head: No headache.    Integument: No rashes or lesions.  Chest: No shortness of breath.  Cardiovascular: No chest pain.  Abdomen: No abdominal pain.  No nausea or vomiting.  Urinary: No abnormal urination.  Neurologic: No focal weakness.  No numbness.  Hematologic: No easy bruising.  Endocrine: No excessive thirst or urination.      Review of patient's allergies indicates:   Allergen Reactions    Thorazine [chlorpromazine]      "get overheated when I go outside after taking"       No current facility-administered medications on file prior to encounter.      Current Outpatient Medications on File Prior to Encounter   Medication Sig Dispense Refill    benztropine (COGENTIN) 0.5 MG tablet Take 0.5 mg by mouth 2 (two) times daily.      chlorproMAZINE (THORAZINE) 200 MG tablet Take 200 mg by mouth 3 (three) times daily.      divalproex (DEPAKOTE) 125 MG EC tablet Take 125 mg by mouth 3 (three) times daily.      haloperidol (HALDOL) 10 MG tablet Take 10 mg by mouth 4 (four) times daily.      hydrOXYzine (VISTARIL) 100 MG capsule Take 100 mg by mouth 4 (four) times daily.      lithium carbonate 150 MG capsule Take 300 mg by mouth 3 (three) times daily with meals.       naloxone (NARCAN) 1 mg/mL injection 2 mg (1 mg per nostril) by Nasal route " as needed for opioid overdose; may repeat in 3 to 5 minutes if not effective. Call 911 2 mL 0    quetiapine (SEROQUEL) 400 MG tablet Take 1 tablet (400 mg total) by mouth every evening. 30 tablet 1    risperidone (RISPERDAL) 1 MG tablet Take 1 mg by mouth 2 (two) times daily.         PMH:  As per HPI and below:  Past Medical History:   Diagnosis Date    Bipolar 1 disorder     Hepatitis C     History of psychiatric care     History of psychiatric hospitalization     Psychiatric exam requested by authority     Psychiatric problem     Schizo affective schizophrenia     Therapy      Past Surgical History:   Procedure Laterality Date    ADENOIDECTOMY      EYE SURGERY      KNEE SURGERY      TONSILLECTOMY         Social History     Socioeconomic History    Marital status: Single     Spouse name: Not on file    Number of children: Not on file    Years of education: Not on file    Highest education level: Not on file   Occupational History    Not on file   Social Needs    Financial resource strain: Not on file    Food insecurity     Worry: Not on file     Inability: Not on file    Transportation needs     Medical: Not on file     Non-medical: Not on file   Tobacco Use    Smoking status: Current Every Day Smoker     Packs/day: 0.25     Years: 3.00     Pack years: 0.75     Types: Cigarettes   Substance and Sexual Activity    Alcohol use: No    Drug use: Yes     Types: Marijuana, Cocaine     Comment: heroin    Sexual activity: Never   Lifestyle    Physical activity     Days per week: Not on file     Minutes per session: Not on file    Stress: Not on file   Relationships    Social connections     Talks on phone: Not on file     Gets together: Not on file     Attends Islam service: Not on file     Active member of club or organization: Not on file     Attends meetings of clubs or organizations: Not on file     Relationship status: Not on file   Other Topics Concern    Patient feels they ought to  cut down on drinking/drug use No    Patient annoyed by others criticizing their drinking/drug use No    Patient has felt bad or guilty about drinking/drug use No    Patient has had a drink/used drugs as an eye opener in the AM No   Social History Narrative    Not on file       Family History   Problem Relation Age of Onset    Heart disease Mother     Diabetes Maternal Aunt        Physical Exam:    Vitals:    06/20/20 0209   BP: (!) 144/77   Pulse: 82   Resp: 18   Temp: 98.5 °F (36.9 °C)     Appearance: No acute distress.  Skin: No rashes seen.  Good turgor.  No abrasions.  No ecchymoses.  Eyes: No conjunctival injection. EOMI, PERRL.  ENT: Oropharynx clear.    Chest:  No increased work of breathing, bilateral chest rise.  Cardiovascular: Regular rate and rhythm.    Abdomen: Soft.  Not distended.  Nontender.  No guarding.  No rebound. No Masses  Musculoskeletal: Good range of motion all joints.  No deformities.  Neck supple.  No meningismus.  Neurologic: Moves all extremities.  Normal sensation.  No facial droop.  Normal speech.    Mental Status:  Awake and alert.  Appears confused, responding to internal stimuli.  Tangential.          Laboratory Studies:  Labs Reviewed   CBC W/ AUTO DIFFERENTIAL - Abnormal; Notable for the following components:       Result Value    WBC 13.46 (*)     Hemoglobin 12.8 (*)     Mean Corpuscular Hemoglobin 26.0 (*)     Mean Corpuscular Hemoglobin Conc 31.8 (*)     Lymph # 4.9 (*)     Mono # 1.4 (*)     All other components within normal limits   COMPREHENSIVE METABOLIC PANEL - Abnormal; Notable for the following components:    CO2 22 (*)     ALT 45 (*)     All other components within normal limits   ACETAMINOPHEN LEVEL - Abnormal; Notable for the following components:    Acetaminophen (Tylenol), Serum <3.0 (*)     All other components within normal limits   TSH   URINALYSIS, REFLEX TO URINE CULTURE    Narrative:     Preferred Collection Type->Urine, Clean Catch                   Specimen Source->Urine   DRUG SCREEN PANEL, URINE EMERGENCY    Narrative:     Preferred Collection Type->Urine, Clean Catch                  Specimen Source->Urine   ALCOHOL,MEDICAL (ETHANOL)   SARS-COV-2 RNA AMPLIFICATION, QUAL   URINALYSIS MICROSCOPIC    Narrative:     Preferred Collection Type->Urine, Clean Catch                  Specimen Source->Urine       I decided to obtain the old medical records.  Reviewed and summarized the old medical record and it showed history of psychiatric visits approximately monthly during 2020    Imaging Results    None         Medications Given:  Medications - No data to display    Discussed with:  Patient    MDM:    24 y.o. male with suicidal ideation.  He is hemodynamically stable.  He has mild leukocytosis likely associated dehydration/drug abuse earlier.  He has no signs of infection currently.  He is medically clear and stable for psychiatric evaluation.  Feel he represents an acute threat to himself and would benefit from inpatient psychiatric care.  PEC filled out, will begin search for placement.  Diagnostic Impression:    1. Suicidal ideation             Aakash Su MD  06/20/20 8258

## 2020-07-14 ENCOUNTER — HOSPITAL ENCOUNTER (EMERGENCY)
Facility: HOSPITAL | Age: 25
Discharge: HOME | End: 2020-07-14
Attending: EMERGENCY MEDICINE
Payer: MEDICARE

## 2020-07-14 VITALS
OXYGEN SATURATION: 95 % | RESPIRATION RATE: 18 BRPM | HEART RATE: 96 BPM | HEIGHT: 69 IN | BODY MASS INDEX: 29.92 KG/M2 | WEIGHT: 202 LBS | TEMPERATURE: 98 F | SYSTOLIC BLOOD PRESSURE: 144 MMHG | DIASTOLIC BLOOD PRESSURE: 87 MMHG

## 2020-07-14 DIAGNOSIS — L02.414 ABSCESS OF LEFT ARM: Primary | ICD-10-CM

## 2020-07-14 DIAGNOSIS — M79.5 FOREIGN BODY (FB) IN SOFT TISSUE: ICD-10-CM

## 2020-07-14 PROCEDURE — 99283 PR EMERGENCY DEPT VISIT,LEVEL III: ICD-10-PCS | Mod: ,,, | Performed by: EMERGENCY MEDICINE

## 2020-07-14 PROCEDURE — 99283 EMERGENCY DEPT VISIT LOW MDM: CPT | Mod: 25

## 2020-07-14 PROCEDURE — 25000003 PHARM REV CODE 250: Performed by: EMERGENCY MEDICINE

## 2020-07-14 PROCEDURE — 99283 EMERGENCY DEPT VISIT LOW MDM: CPT | Mod: ,,, | Performed by: EMERGENCY MEDICINE

## 2020-07-14 RX ORDER — LIDOCAINE HYDROCHLORIDE AND EPINEPHRINE 10; 10 MG/ML; UG/ML
1 INJECTION, SOLUTION INFILTRATION; PERINEURAL ONCE
Status: DISCONTINUED | OUTPATIENT
Start: 2020-07-14 | End: 2020-07-14 | Stop reason: HOSPADM

## 2020-07-14 RX ORDER — SULFAMETHOXAZOLE AND TRIMETHOPRIM 800; 160 MG/1; MG/1
1 TABLET ORAL 2 TIMES DAILY
Qty: 14 TABLET | Refills: 0 | Status: SHIPPED | OUTPATIENT
Start: 2020-07-14 | End: 2020-07-21

## 2020-07-14 RX ORDER — SULFAMETHOXAZOLE AND TRIMETHOPRIM 800; 160 MG/1; MG/1
2 TABLET ORAL
Status: COMPLETED | OUTPATIENT
Start: 2020-07-14 | End: 2020-07-14

## 2020-07-14 RX ADMIN — SULFAMETHOXAZOLE AND TRIMETHOPRIM 2 TABLET: 800; 160 TABLET ORAL at 11:07

## 2020-07-14 NOTE — ED NOTES
Pt escorted off the property by ed security. Pt provided with discharge papers. Pt raised hand at staff and cursing at staff. Per Dr. Ford pt is discharged.
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
## 2020-07-14 NOTE — ED NOTES
"ED Social Workers (SW) entered pt's room with permission. Pt is lying down in bed with jacket over his head. Pt confirmed identifiers.     Pt reports that he would like to go to rehab after hospitalization. Pt reports that he would like to receive inpatient rehab.    SW will call centers for availability.     After SW left room. Pt began to yelled "Fuck, man."     From there pt, left room. Security and RN intervened.     SW will follow up as needed.     - Gisella Lakhani, LD   [phone]          "

## 2020-07-14 NOTE — ED TRIAGE NOTES
PT presents to ed reports left arm pain and swelling. Pt reports for a few days its been hurting. PT reports shooting up crack.      LOC: The patient is awake, alert, and aware of environment. The patient is oriented x 3 and speaking appropriately.   APPEARANCE: No acute distress noted.   PSYCHOSOCIAL: Patient is calm and cooperative.   SKIN: The skin is warm, dry.   RESPIRATORY: Airway is open and patent. Bilateral chest rise and fall. Respirations are spontaneous, even and unlabored. Normal effort and rate noted. No accessory muscle use noted.   CARDIAC:  Denies chest pain or SOB.   ABDOMEN: Soft and non tender to palpation. No distention noted.   URINARY:  Voids independently.   EXTREMITIES: swelling and redness noted to left antecubital area.   NEUROLOGIC: Eyes open spontaneously. Speech clear. Tolerating saliva secretions well. Able to follow commands, demonstrating ability to actively and appropriately communicate within context of current conversation. Symmetrical facial muscles. Moving all extremities well. Movement is purposeful.   MUSCULOSKELETAL: No obvious deformities noted.

## 2022-09-05 ENCOUNTER — HOSPITAL ENCOUNTER (EMERGENCY)
Facility: HOSPITAL | Age: 27
Discharge: PSYCHIATRIC OR SUBSTANCE USE FACILITY | End: 2022-09-06
Attending: EMERGENCY MEDICINE
Payer: MEDICARE

## 2022-09-05 DIAGNOSIS — F29 PSYCHOSIS, UNSPECIFIED PSYCHOSIS TYPE: Primary | ICD-10-CM

## 2022-09-05 DIAGNOSIS — Z91.89 AT RISK FOR PROLONGED QT INTERVAL SYNDROME: ICD-10-CM

## 2022-09-05 LAB
ALBUMIN SERPL BCP-MCNC: 4 G/DL (ref 3.5–5.2)
ALP SERPL-CCNC: 42 U/L (ref 55–135)
ALT SERPL W/O P-5'-P-CCNC: 47 U/L (ref 10–44)
AMORPH CRY UR QL COMP ASSIST: NORMAL
AMPHET+METHAMPHET UR QL: ABNORMAL
ANION GAP SERPL CALC-SCNC: 7 MMOL/L (ref 8–16)
APAP SERPL-MCNC: <3 UG/ML (ref 10–20)
AST SERPL-CCNC: 31 U/L (ref 10–40)
BACTERIA #/AREA URNS AUTO: NORMAL /HPF
BARBITURATES UR QL SCN>200 NG/ML: NEGATIVE
BASOPHILS # BLD AUTO: 0.06 K/UL (ref 0–0.2)
BASOPHILS NFR BLD: 0.5 % (ref 0–1.9)
BENZODIAZ UR QL SCN>200 NG/ML: NEGATIVE
BILIRUB SERPL-MCNC: 0.6 MG/DL (ref 0.1–1)
BILIRUB UR QL STRIP: NEGATIVE
BUN SERPL-MCNC: 9 MG/DL (ref 6–20)
BZE UR QL SCN: ABNORMAL
CALCIUM SERPL-MCNC: 9.3 MG/DL (ref 8.7–10.5)
CANNABINOIDS UR QL SCN: ABNORMAL
CHLORIDE SERPL-SCNC: 103 MMOL/L (ref 95–110)
CLARITY UR REFRACT.AUTO: ABNORMAL
CO2 SERPL-SCNC: 29 MMOL/L (ref 23–29)
COLOR UR AUTO: YELLOW
CREAT SERPL-MCNC: 0.8 MG/DL (ref 0.5–1.4)
CREAT UR-MCNC: 277 MG/DL (ref 23–375)
DIFFERENTIAL METHOD: ABNORMAL
EOSINOPHIL # BLD AUTO: 0.2 K/UL (ref 0–0.5)
EOSINOPHIL NFR BLD: 1.7 % (ref 0–8)
ERYTHROCYTE [DISTWIDTH] IN BLOOD BY AUTOMATED COUNT: 13.3 % (ref 11.5–14.5)
EST. GFR  (NO RACE VARIABLE): >60 ML/MIN/1.73 M^2
ETHANOL SERPL-MCNC: <10 MG/DL
GLUCOSE SERPL-MCNC: 107 MG/DL (ref 70–110)
GLUCOSE UR QL STRIP: NEGATIVE
HCT VFR BLD AUTO: 37.9 % (ref 40–54)
HGB BLD-MCNC: 12.8 G/DL (ref 14–18)
HGB UR QL STRIP: NEGATIVE
HYALINE CASTS UR QL AUTO: 0 /LPF
IMM GRANULOCYTES # BLD AUTO: 0.03 K/UL (ref 0–0.04)
IMM GRANULOCYTES NFR BLD AUTO: 0.3 % (ref 0–0.5)
KETONES UR QL STRIP: NEGATIVE
LEUKOCYTE ESTERASE UR QL STRIP: NEGATIVE
LYMPHOCYTES # BLD AUTO: 3.4 K/UL (ref 1–4.8)
LYMPHOCYTES NFR BLD: 30.9 % (ref 18–48)
MCH RBC QN AUTO: 27 PG (ref 27–31)
MCHC RBC AUTO-ENTMCNC: 33.8 G/DL (ref 32–36)
MCV RBC AUTO: 80 FL (ref 82–98)
METHADONE UR QL SCN>300 NG/ML: NEGATIVE
MICROSCOPIC COMMENT: NORMAL
MONOCYTES # BLD AUTO: 1 K/UL (ref 0.3–1)
MONOCYTES NFR BLD: 8.7 % (ref 4–15)
NEUTROPHILS # BLD AUTO: 6.4 K/UL (ref 1.8–7.7)
NEUTROPHILS NFR BLD: 57.9 % (ref 38–73)
NITRITE UR QL STRIP: NEGATIVE
NRBC BLD-RTO: 0 /100 WBC
OPIATES UR QL SCN: NEGATIVE
PCP UR QL SCN>25 NG/ML: NEGATIVE
PH UR STRIP: 6 [PH] (ref 5–8)
PLATELET # BLD AUTO: 196 K/UL (ref 150–450)
PMV BLD AUTO: 11.4 FL (ref 9.2–12.9)
POTASSIUM SERPL-SCNC: 3.7 MMOL/L (ref 3.5–5.1)
PROT SERPL-MCNC: 6.8 G/DL (ref 6–8.4)
PROT UR QL STRIP: ABNORMAL
RBC # BLD AUTO: 4.74 M/UL (ref 4.6–6.2)
RBC #/AREA URNS AUTO: 4 /HPF (ref 0–4)
SARS-COV-2 RDRP RESP QL NAA+PROBE: NEGATIVE
SODIUM SERPL-SCNC: 139 MMOL/L (ref 136–145)
SP GR UR STRIP: >1.03 (ref 1–1.03)
TOXICOLOGY INFORMATION: ABNORMAL
TSH SERPL DL<=0.005 MIU/L-ACNC: 0.87 UIU/ML (ref 0.4–4)
URN SPEC COLLECT METH UR: ABNORMAL
WBC # BLD AUTO: 11.02 K/UL (ref 3.9–12.7)
WBC #/AREA URNS AUTO: 0 /HPF (ref 0–5)

## 2022-09-05 PROCEDURE — 99285 EMERGENCY DEPT VISIT HI MDM: CPT

## 2022-09-05 PROCEDURE — U0002 COVID-19 LAB TEST NON-CDC: HCPCS | Performed by: EMERGENCY MEDICINE

## 2022-09-05 PROCEDURE — 25000003 PHARM REV CODE 250: Performed by: EMERGENCY MEDICINE

## 2022-09-05 PROCEDURE — 93010 ELECTROCARDIOGRAM REPORT: CPT | Mod: ,,, | Performed by: INTERNAL MEDICINE

## 2022-09-05 PROCEDURE — 93005 ELECTROCARDIOGRAM TRACING: CPT

## 2022-09-05 PROCEDURE — 82077 ASSAY SPEC XCP UR&BREATH IA: CPT | Performed by: EMERGENCY MEDICINE

## 2022-09-05 PROCEDURE — 80143 DRUG ASSAY ACETAMINOPHEN: CPT | Performed by: EMERGENCY MEDICINE

## 2022-09-05 PROCEDURE — 85025 COMPLETE CBC W/AUTO DIFF WBC: CPT | Performed by: EMERGENCY MEDICINE

## 2022-09-05 PROCEDURE — 81001 URINALYSIS AUTO W/SCOPE: CPT | Mod: 59 | Performed by: EMERGENCY MEDICINE

## 2022-09-05 PROCEDURE — 99284 PR EMERGENCY DEPT VISIT,LEVEL IV: ICD-10-PCS | Mod: CS,,, | Performed by: EMERGENCY MEDICINE

## 2022-09-05 PROCEDURE — 80307 DRUG TEST PRSMV CHEM ANLYZR: CPT | Performed by: EMERGENCY MEDICINE

## 2022-09-05 PROCEDURE — 99284 EMERGENCY DEPT VISIT MOD MDM: CPT | Mod: CS,,, | Performed by: EMERGENCY MEDICINE

## 2022-09-05 PROCEDURE — 80053 COMPREHEN METABOLIC PANEL: CPT | Performed by: EMERGENCY MEDICINE

## 2022-09-05 PROCEDURE — 84443 ASSAY THYROID STIM HORMONE: CPT | Performed by: EMERGENCY MEDICINE

## 2022-09-05 PROCEDURE — 93010 EKG 12-LEAD: ICD-10-PCS | Mod: ,,, | Performed by: INTERNAL MEDICINE

## 2022-09-05 RX ORDER — HALOPERIDOL 5 MG/1
10 TABLET ORAL
Status: COMPLETED | OUTPATIENT
Start: 2022-09-05 | End: 2022-09-05

## 2022-09-05 RX ADMIN — HALOPERIDOL 10 MG: 5 TABLET ORAL at 11:09

## 2022-09-06 VITALS
SYSTOLIC BLOOD PRESSURE: 153 MMHG | HEART RATE: 108 BPM | DIASTOLIC BLOOD PRESSURE: 91 MMHG | BODY MASS INDEX: 29.53 KG/M2 | TEMPERATURE: 98 F | OXYGEN SATURATION: 98 % | WEIGHT: 200 LBS | RESPIRATION RATE: 20 BRPM

## 2022-09-06 PROCEDURE — 25000003 PHARM REV CODE 250: Performed by: EMERGENCY MEDICINE

## 2022-09-06 RX ORDER — DIAZEPAM 5 MG/1
5 TABLET ORAL
Status: COMPLETED | OUTPATIENT
Start: 2022-09-06 | End: 2022-09-06

## 2022-09-06 RX ADMIN — DIAZEPAM 5 MG: 5 TABLET ORAL at 01:09

## 2022-09-06 NOTE — ED PROVIDER NOTES
"Encounter Date: 9/5/2022       History     Chief Complaint   Patient presents with    Suicidal     Pt states has been out of meds for about a week. Reports feeling suicidal and states wants to hurt himself     26M with PMH bipolar d/o, schizoaffective d/o, psychosis, presenting with request for medication. Per triage note pt endorsed SI, however he denies this now. He denies HI, hallucinations, drug abuse, ETOH abuse. He reports he ran out of his Haldol and is requesting more Haldol. He states he was recently inpatient but cannot say when he was discharged.     Review of patient's allergies indicates:   Allergen Reactions    Thorazine [chlorpromazine]      "get overheated when I go outside after taking"     Past Medical History:   Diagnosis Date    Bipolar 1 disorder     Hepatitis C     History of psychiatric care     History of psychiatric hospitalization     Psychiatric exam requested by authority     Psychiatric problem     Schizo affective schizophrenia     Therapy      Past Surgical History:   Procedure Laterality Date    ADENOIDECTOMY      EYE SURGERY      KNEE SURGERY      TONSILLECTOMY       Family History   Problem Relation Age of Onset    Heart disease Mother     Diabetes Maternal Aunt      Social History     Tobacco Use    Smoking status: Every Day     Packs/day: 0.25     Years: 3.00     Pack years: 0.75     Types: Cigarettes   Substance Use Topics    Alcohol use: No    Drug use: Yes     Types: Marijuana, Cocaine     Comment: heroin     Review of Systems   Constitutional:  Negative for chills and fever.   Respiratory:  Negative for shortness of breath.    Cardiovascular:  Negative for chest pain.   Gastrointestinal:  Negative for abdominal pain, nausea and vomiting.   Genitourinary:  Negative for decreased urine volume, difficulty urinating, flank pain and urgency.   Skin:  Negative for wound.   Psychiatric/Behavioral:  Positive for dysphoric mood and suicidal ideas. Negative for agitation, confusion, " hallucinations and sleep disturbance. The patient is not nervous/anxious.      Physical Exam     Initial Vitals [09/05/22 2108]   BP Pulse Resp Temp SpO2   (!) 153/91 108 20 98.4 °F (36.9 °C) 98 %      MAP       --         Physical Exam    Nursing note and vitals reviewed.  Constitutional: He appears well-developed and well-nourished. He is not diaphoretic. No distress.   HENT:   Head: Normocephalic and atraumatic.   Nose: Nose normal.   Eyes: Conjunctivae and EOM are normal. Pupils are equal, round, and reactive to light.   Neck: Neck supple.   Normal range of motion.  Cardiovascular:  Normal rate and regular rhythm.           Pulmonary/Chest: Breath sounds normal. No respiratory distress. He has no wheezes. He has no rhonchi. He has no rales. He exhibits no tenderness.   Abdominal: Abdomen is soft. Bowel sounds are normal. He exhibits no distension. There is no abdominal tenderness.   Musculoskeletal:         General: Normal range of motion.      Cervical back: Normal range of motion and neck supple.     Neurological: He is alert and oriented to person, place, and time. He has normal strength.   Skin: Skin is warm and dry. No rash noted.   Psychiatric:   Odd affect, appears to be responding to internal stimuli, answering questions but slowly       ED Course   Procedures  Labs Reviewed   CBC W/ AUTO DIFFERENTIAL - Abnormal; Notable for the following components:       Result Value    Hemoglobin 12.8 (*)     Hematocrit 37.9 (*)     MCV 80 (*)     All other components within normal limits   COMPREHENSIVE METABOLIC PANEL - Abnormal; Notable for the following components:    Alkaline Phosphatase 42 (*)     ALT 47 (*)     Anion Gap 7 (*)     All other components within normal limits   DRUG SCREEN PANEL, URINE EMERGENCY - Abnormal; Notable for the following components:    Cocaine (Metab.) Presumptive Positive (*)     Amphetamine Screen, Ur Presumptive Positive (*)     THC Presumptive Positive (*)     All other components  within normal limits    Narrative:     Specimen Source->Urine   ACETAMINOPHEN LEVEL - Abnormal; Notable for the following components:    Acetaminophen (Tylenol), Serum <3.0 (*)     All other components within normal limits   TSH   ALCOHOL,MEDICAL (ETHANOL)   SARS-COV-2 RNA AMPLIFICATION, QUAL   URINALYSIS, REFLEX TO URINE CULTURE   URINALYSIS MICROSCOPIC          Imaging Results    None          Medications - No data to display  Medical Decision Making:   History:   Old Medical Records: I decided to obtain old medical records.  Old Records Summarized: records from clinic visits, records from previous admission(s) and records from another hospital.       <> Summary of Records: Pt most recently admitted for psychosis on 8/1/22  Initial Assessment:   Pt alternately endorsing then denying depression and SI, states he needs Haldol although denying hallxn, but does have odd affect and appears to be responding to internal stimuli. Will give Haldol, place on PEC for safety.   Differential Diagnosis:   Psychiatric disorder, electrolyte abnormality, metabolic abnormality, infection, drug intoxication, polypharmacy    Independently Interpreted Test(s):   I have ordered and independently interpreted EKG Reading(s) - see summary below       <> Summary of EKG Reading(s): NSR 84, no QT prolongation  Clinical Tests:   Lab Tests: Ordered and Reviewed  ED Management:  Pending medical clearance for psychiatry           ED Course as of 09/05/22 2311   Mon Sep 05, 2022   2242 Cocaine (Metab.)(!): Presumptive Positive [JR]   2242 Amphetamine Screen, Ur(!): Presumptive Positive [JR]   2242 Marijuana (THC) Metabolite(!): Presumptive Positive [JR]   2242 Comprehensive metabolic panel(!) [JR]   2242 CBC auto differential(!) [JR]   2242 Urinalysis, Reflex to Urine Culture Urine, Clean Catch(!) [JR]   2242 SARS-CoV-2 RNA, Amplification, Qual: Negative [JR]   2242 Alcohol, Serum: <10 [JR]   2242 TSH: 0.871 [JR]   2243 BP(!): 153/91 [JR]      ED  Course User Index  [JR] Nae Parekh MD             Clinical Impression:   Final diagnoses:  [Z91.89] At risk for prolonged QT interval syndrome  [F29] Psychosis, unspecified psychosis type (Primary)             Nae Parekh MD  09/05/22 9598

## 2022-09-06 NOTE — ED NOTES
Bed: [facility] 04  Expected date:   Expected time:   Means of arrival:   Comments:

## 2022-09-06 NOTE — ED NOTES
Pt remains in paper scrubs.  Room free from hazardous items.  Sitter remains at bedside with direct visual contact and q15min assessments being documented.  Pt in NAD and VSS at this time.  Pending placement to [facility] facility

## 2022-09-06 NOTE — ED NOTES
Pt remains in paper scrubs.  Room free from hazardous items.  Sitter remains at bedside with direct visual contact and q15min assessments being documented.  Pt in NAD and VSS at this time.  Pending transportation to facility

## 2022-09-06 NOTE — ED NOTES
Pt changed into blue scrubs. He is calm and cooperative. Pt belongings put into bag and in locked closet. Pt only has clothes on arrival to the ED and one pair of shoes. No ID phone or wallet.

## 2022-09-06 NOTE — PROVIDER PROGRESS NOTES - EMERGENCY DEPT.
1:14 AM  Patient requesting discharge home, informed PEC order is in place. He is slightly agitated, though still cooperative. He requests food and to speak with his mom, granted. Valium was ordered for mild anxiety.

## 2022-09-06 NOTE — ED NOTES
Pt remains in paper scrubs.  Room free from hazardous items.  Sitter remains at bedside with direct visual contact and q15min assessments being documented.  Pt in NAD and VSS at this time. Resting comfortably in bed with eyes closed. Pending transport to [facility] facility

## 2022-09-06 NOTE — ED TRIAGE NOTES
"Zack Bhatia, a 26 y.o. male presents to the ED w/ complaint of suicidal ideation. Pt states he ran out of his medication about a week ago and has had thoughts of hurting himself. Denies HI. Calm and cooperative at this time.    Triage note:  Chief Complaint   Patient presents with    Suicidal     Pt states has been out of meds for about a week. Reports feeling suicidal and states wants to hurt himself     Review of patient's allergies indicates:   Allergen Reactions    Thorazine [chlorpromazine]      "get overheated when I go outside after taking"     Past Medical History:   Diagnosis Date    Bipolar 1 disorder     Hepatitis C     History of psychiatric care     History of psychiatric hospitalization     Psychiatric exam requested by authority     Psychiatric problem     Schizo affective schizophrenia     Therapy      LOC: The patient is awake, alert, and oriented to self, place, time, and situation. Pt is calm and cooperative. Affect is appropriate.  Speech is appropriate and clear.     APPEARANCE: Patient resting comfortably in no acute distress.  Patient is clean and well groomed.    SKIN: The skin is warm and dry; color consistent with ethnicity.  Patient has normal skin turgor and moist mucus membranes.  Skin intact; no breakdown or bruising noted.     MUSCULOSKELETAL: Patient moving upper and lower extremities without difficulty; denies pain in the extremities or back.  Denies weakness.     RESPIRATORY: Airway is open and patent. Respirations spontaneous, even, easy, and non-labored.  Patient has a normal effort and rate.  No accessory muscle use noted. Denies cough.     CARDIAC:  Normal rate noted.  No peripheral edema noted. No complaints of chest pain.     ABDOMEN: Soft and non tender to palpation.  No distention noted. Pt denies abdominal pain; denies nausea, vomiting, diarrhea, or constipation.    NEUROLOGIC: Eyes open spontaneously.  Behavior appropriate to situation.  Follows commands; facial " expression symmetrical.  Purposeful motor response noted; normal sensation in all extremities. Pt denies headache; denies lightheadedness or dizziness; denies visual disturbances; denies loss of balance; denies unilateral weakness

## 2022-09-06 NOTE — ED NOTES
Pt remains in paper scrubs.  Room free from hazardous items.  Sitter remains at bedside with direct visual contact and q15min assessments being documented.  Pt in NAD and VSS at this time. Resting comfortably in bed with eyes closed. Pending transport to [facility] facility

## 2022-09-06 NOTE — ED NOTES
Pt remains in paper scrubs.  Room free from hazardous items.  Sitter remains at bedside with direct visual contact and q15min assessments being documented.  Pt appears anxious, is pacing room saying room is haunted. VSS at this time.  Pending medical clearance and psych placement.

## 2022-09-22 ENCOUNTER — HOSPITAL ENCOUNTER (EMERGENCY)
Facility: HOSPITAL | Age: 27
Discharge: LEFT BEFORE DISCHARGE | End: 2022-09-22
Attending: EMERGENCY MEDICINE
Payer: MEDICARE

## 2022-09-22 VITALS
TEMPERATURE: 98 F | DIASTOLIC BLOOD PRESSURE: 99 MMHG | RESPIRATION RATE: 18 BRPM | SYSTOLIC BLOOD PRESSURE: 143 MMHG | OXYGEN SATURATION: 97 % | HEART RATE: 92 BPM

## 2022-09-22 DIAGNOSIS — R40.20 LOSS OF CONSCIOUSNESS: ICD-10-CM

## 2022-09-22 DIAGNOSIS — T50.901A ACCIDENTAL DRUG OVERDOSE, INITIAL ENCOUNTER: Primary | ICD-10-CM

## 2022-09-22 DIAGNOSIS — T40.601A OPIATE OVERDOSE, ACCIDENTAL OR UNINTENTIONAL, INITIAL ENCOUNTER: ICD-10-CM

## 2022-09-22 PROCEDURE — 99283 EMERGENCY DEPT VISIT LOW MDM: CPT

## 2022-09-22 RX ORDER — NALOXONE HYDROCHLORIDE 1 MG/ML
INJECTION INTRAMUSCULAR; INTRAVENOUS; SUBCUTANEOUS
Qty: 2 ML | Refills: 0 | Status: ON HOLD | OUTPATIENT
Start: 2022-09-22 | End: 2023-06-05 | Stop reason: HOSPADM

## 2022-09-22 RX ORDER — NALOXONE HYDROCHLORIDE 4 MG/.1ML
SPRAY NASAL
Qty: 1 EACH | Refills: 0 | Status: ON HOLD | OUTPATIENT
Start: 2022-09-22 | End: 2023-06-05 | Stop reason: HOSPADM

## 2022-09-23 NOTE — ED NOTES
MD and scribing RN are at the bedside for initial assessments. Pt is refusing bloodwork, fluids, and an EKG. He has taken his IV out on his own. Pt says he would like to leave. EMS states that the patient's mom is on her way. Pt is aaox4

## 2022-09-23 NOTE — ED PROVIDER NOTES
"Encounter Date: 9/22/2022       History     Chief Complaint   Patient presents with    Drug Overdose     EMT states pt was unresponsive when police got there.  Pt states he took heroin.  EMT gave pt .5 IV narcan.  Police gave pt 4 mg IN Narcan. Pt awake, alert and oriented.  Denies any suicidal ideation.     27 y/o M brought to the ED by EMS after found unresponsive. Patient was given narcan IM and IN with return to neurologic baseline. On arrival patient admits to using heroin today. However, he does not want to stay for treatment in the emergency department. Informed him of my concern that the narcan would wear off before whatever he used does, and he may lose consciousness again. Patient requesting his IV out, but amenable to staying in the emergency department for evaluation and monitoring at this time. Denies any symptoms on arrival.     Review of patient's allergies indicates:   Allergen Reactions    Thorazine [chlorpromazine]      "get overheated when I go outside after taking"     Past Medical History:   Diagnosis Date    Bipolar 1 disorder     Hepatitis C     History of psychiatric care     History of psychiatric hospitalization     Psychiatric exam requested by authority     Psychiatric problem     Schizo affective schizophrenia     Therapy      Past Surgical History:   Procedure Laterality Date    ADENOIDECTOMY      EYE SURGERY      KNEE SURGERY      TONSILLECTOMY       Family History   Problem Relation Age of Onset    Heart disease Mother     Diabetes Maternal Aunt      Social History     Tobacco Use    Smoking status: Every Day     Packs/day: 0.25     Years: 3.00     Pack years: 0.75     Types: Cigarettes   Substance Use Topics    Alcohol use: No    Drug use: Yes     Types: Marijuana, Cocaine     Comment: heroin     Review of Systems   Constitutional:  Negative for chills, fatigue and fever.   HENT:  Negative for congestion and sore throat.    Eyes:  Negative for photophobia and visual disturbance. "   Respiratory:  Negative for cough and shortness of breath.    Cardiovascular:  Negative for chest pain and palpitations.   Gastrointestinal:  Negative for abdominal pain, diarrhea and vomiting.   Musculoskeletal:  Negative for back pain, neck pain and neck stiffness.   Neurological:  Negative for light-headedness, numbness and headaches.     Physical Exam     Initial Vitals [09/22/22 2055]   BP Pulse Resp Temp SpO2   (!) 143/99 92 18 97.9 °F (36.6 °C) 97 %      MAP       --         Physical Exam    Nursing note and vitals reviewed.  Constitutional: He appears well-developed and well-nourished. No distress.   HENT:   Head: Normocephalic and atraumatic.   Eyes: Conjunctivae and EOM are normal. Pupils are equal, round, and reactive to light.   Neck: Neck supple. No tracheal deviation present.   Normal range of motion.  Cardiovascular:  Normal rate and intact distal pulses.           Pulmonary/Chest: No respiratory distress.   Musculoskeletal:         General: No tenderness or edema. Normal range of motion.      Cervical back: Normal range of motion and neck supple.     Neurological: He is alert and oriented to person, place, and time. He has normal strength. No cranial nerve deficit. GCS score is 15. GCS eye subscore is 4. GCS verbal subscore is 5. GCS motor subscore is 6.   Skin: Skin is warm and dry.       ED Course   Procedures  Labs Reviewed   CBC W/ AUTO DIFFERENTIAL   COMPREHENSIVE METABOLIC PANEL   URINALYSIS   DRUG SCREEN PANEL, URINE EMERGENCY   ALCOHOL,MEDICAL (ETHANOL)   TROPONIN I   MAGNESIUM   CK                 Medications - No data to display  Medical Decision Making:   Initial Assessment:   25 y/o M brought to the ED for evaluation of LOC, possibly secondary to OD  Differential Diagnosis:   Dehydration, electrolyte dyscrasia, anemia, vasovagal, seizure v syncope v OD, toxidrome, withdrawal, arrhythmia   ED Management:  Unfortunately, shortly after agreeing to have blood work drawn and observation in  the ED, patient requesting to leave AMA. I reiterated my concerns about the narcan wearing off before whatever causes his LOC, or that some other cause may be contributing to his LOC. Informed him he risks having recurrence or worsening of his LOC, including anoxic brain injury, coma, significant and/or permanent disability, or death. Patient acknowledged these concerns. Informed him he is welcome to return at any time should he change his mind or have any new or concerning symptoms. Patient given Rx for narcan, but reiterates his decision to leave AMA.                         Clinical Impression:   Final diagnoses:  [R40.20] Loss of consciousness  [T50.901A] Accidental drug overdose, initial encounter (Primary)  [T40.601A] Opiate overdose, accidental or unintentional, initial encounter        ED Disposition Condition    AMA Stable                Jake Ramos MD  09/22/22 1462

## 2022-09-23 NOTE — DISCHARGE INSTRUCTIONS
Please do not abuse illicit drugs. Please return to the emergency department at any time if you wish to resume your evaluation, or if you have any new or concerning symptoms.

## 2022-09-23 NOTE — ED NOTES
Pt signed AMA form after verbalizing understanding of the risks of leaving AMA. Pt ambulated out of the ER with a steady gait

## 2022-09-27 ENCOUNTER — HOSPITAL ENCOUNTER (EMERGENCY)
Facility: HOSPITAL | Age: 27
Discharge: PSYCHIATRIC OR SUBSTANCE USE FACILITY | End: 2022-09-28
Attending: EMERGENCY MEDICINE
Payer: MEDICARE

## 2022-09-27 DIAGNOSIS — R44.3 HALLUCINATIONS: Primary | ICD-10-CM

## 2022-09-27 DIAGNOSIS — Z00.8 MEDICAL CLEARANCE FOR PSYCHIATRIC ADMISSION: ICD-10-CM

## 2022-09-27 LAB
ALBUMIN SERPL BCP-MCNC: 3.8 G/DL (ref 3.5–5.2)
ALP SERPL-CCNC: 48 U/L (ref 55–135)
ALT SERPL W/O P-5'-P-CCNC: 36 U/L (ref 10–44)
ANION GAP SERPL CALC-SCNC: 8 MMOL/L (ref 8–16)
APAP SERPL-MCNC: <3 UG/ML (ref 10–20)
AST SERPL-CCNC: 28 U/L (ref 10–40)
BASOPHILS # BLD AUTO: 0.07 K/UL (ref 0–0.2)
BASOPHILS NFR BLD: 0.4 % (ref 0–1.9)
BILIRUB SERPL-MCNC: 0.6 MG/DL (ref 0.1–1)
BILIRUB UR QL STRIP: NEGATIVE
BUN SERPL-MCNC: 8 MG/DL (ref 6–20)
CALCIUM SERPL-MCNC: 9 MG/DL (ref 8.7–10.5)
CHLORIDE SERPL-SCNC: 105 MMOL/L (ref 95–110)
CLARITY UR: CLEAR
CO2 SERPL-SCNC: 26 MMOL/L (ref 23–29)
COLOR UR: YELLOW
CREAT SERPL-MCNC: 0.7 MG/DL (ref 0.5–1.4)
DIFFERENTIAL METHOD: ABNORMAL
EOSINOPHIL # BLD AUTO: 0.3 K/UL (ref 0–0.5)
EOSINOPHIL NFR BLD: 1.5 % (ref 0–8)
ERYTHROCYTE [DISTWIDTH] IN BLOOD BY AUTOMATED COUNT: 13.2 % (ref 11.5–14.5)
EST. GFR  (NO RACE VARIABLE): >60 ML/MIN/1.73 M^2
ETHANOL SERPL-MCNC: <10 MG/DL
GLUCOSE SERPL-MCNC: 96 MG/DL (ref 70–110)
GLUCOSE UR QL STRIP: NEGATIVE
HCT VFR BLD AUTO: 37.5 % (ref 40–54)
HGB BLD-MCNC: 12.3 G/DL (ref 14–18)
HGB UR QL STRIP: NEGATIVE
IMM GRANULOCYTES # BLD AUTO: 0.07 K/UL (ref 0–0.04)
IMM GRANULOCYTES NFR BLD AUTO: 0.4 % (ref 0–0.5)
KETONES UR QL STRIP: ABNORMAL
LEUKOCYTE ESTERASE UR QL STRIP: NEGATIVE
LYMPHOCYTES # BLD AUTO: 3.2 K/UL (ref 1–4.8)
LYMPHOCYTES NFR BLD: 18.7 % (ref 18–48)
MCH RBC QN AUTO: 25.9 PG (ref 27–31)
MCHC RBC AUTO-ENTMCNC: 32.8 G/DL (ref 32–36)
MCV RBC AUTO: 79 FL (ref 82–98)
MONOCYTES # BLD AUTO: 1.4 K/UL (ref 0.3–1)
MONOCYTES NFR BLD: 8 % (ref 4–15)
NEUTROPHILS # BLD AUTO: 12.2 K/UL (ref 1.8–7.7)
NEUTROPHILS NFR BLD: 71 % (ref 38–73)
NITRITE UR QL STRIP: NEGATIVE
NRBC BLD-RTO: 0 /100 WBC
PH UR STRIP: 6 [PH] (ref 5–8)
PLATELET # BLD AUTO: 206 K/UL (ref 150–450)
PMV BLD AUTO: 11.9 FL (ref 9.2–12.9)
POTASSIUM SERPL-SCNC: 3.5 MMOL/L (ref 3.5–5.1)
PROT SERPL-MCNC: 6.7 G/DL (ref 6–8.4)
PROT UR QL STRIP: ABNORMAL
RBC # BLD AUTO: 4.74 M/UL (ref 4.6–6.2)
SARS-COV-2 RDRP RESP QL NAA+PROBE: NEGATIVE
SODIUM SERPL-SCNC: 139 MMOL/L (ref 136–145)
SP GR UR STRIP: 1.02 (ref 1–1.03)
T4 FREE SERPL-MCNC: 1.13 NG/DL (ref 0.71–1.51)
TSH SERPL DL<=0.005 MIU/L-ACNC: 0.01 UIU/ML (ref 0.4–4)
URN SPEC COLLECT METH UR: ABNORMAL
UROBILINOGEN UR STRIP-ACNC: ABNORMAL EU/DL
WBC # BLD AUTO: 17.18 K/UL (ref 3.9–12.7)

## 2022-09-27 PROCEDURE — 81003 URINALYSIS AUTO W/O SCOPE: CPT | Performed by: EMERGENCY MEDICINE

## 2022-09-27 PROCEDURE — 80143 DRUG ASSAY ACETAMINOPHEN: CPT | Performed by: EMERGENCY MEDICINE

## 2022-09-27 PROCEDURE — 84443 ASSAY THYROID STIM HORMONE: CPT | Performed by: EMERGENCY MEDICINE

## 2022-09-27 PROCEDURE — 80053 COMPREHEN METABOLIC PANEL: CPT | Performed by: EMERGENCY MEDICINE

## 2022-09-27 PROCEDURE — U0002 COVID-19 LAB TEST NON-CDC: HCPCS | Performed by: EMERGENCY MEDICINE

## 2022-09-27 PROCEDURE — 99285 EMERGENCY DEPT VISIT HI MDM: CPT

## 2022-09-27 PROCEDURE — 84439 ASSAY OF FREE THYROXINE: CPT | Performed by: EMERGENCY MEDICINE

## 2022-09-27 PROCEDURE — 82077 ASSAY SPEC XCP UR&BREATH IA: CPT | Performed by: EMERGENCY MEDICINE

## 2022-09-27 PROCEDURE — 85025 COMPLETE CBC W/AUTO DIFF WBC: CPT | Performed by: EMERGENCY MEDICINE

## 2022-09-27 PROCEDURE — 80307 DRUG TEST PRSMV CHEM ANLYZR: CPT | Performed by: EMERGENCY MEDICINE

## 2022-09-27 NOTE — ED PROVIDER NOTES
"Encounter Date: 9/27/2022       History     Chief Complaint   Patient presents with    Hallucinations     Pt arrived via EMS from [facility], pt walked up to  and stated he needed mental health help, + auditory hallucinations, denies SI/HI      HPI  26-year-old male with a history of schizoaffective disorder, bipolar disorder, hepatitis-C, polysubstance use, undomiciled, presenting with hallucinations, responding to internal stimuli  Patient states that he is trying to stay alive, thinks that something is coming out of his body, denies self-harm  Reportedly was at [facility] and asked for psychiatric help  Review of patient's allergies indicates:   Allergen Reactions    Thorazine [chlorpromazine]      "get overheated when I go outside after taking"     Past Medical History:   Diagnosis Date    Bipolar 1 disorder     Hepatitis C     History of psychiatric care     History of psychiatric hospitalization     Psychiatric exam requested by authority     Psychiatric problem     Schizo affective schizophrenia     Therapy      Past Surgical History:   Procedure Laterality Date    ADENOIDECTOMY      EYE SURGERY      KNEE SURGERY      TONSILLECTOMY       Family History   Problem Relation Age of Onset    Heart disease Mother     Diabetes Maternal Aunt      Social History     Tobacco Use    Smoking status: Every Day     Packs/day: 0.25     Years: 3.00     Pack years: 0.75     Types: Cigarettes   Substance Use Topics    Alcohol use: No    Drug use: Yes     Types: Marijuana, Cocaine     Comment: heroin     Review of Systems   Unable to perform ROS: Psychiatric disorder     Physical Exam     Initial Vitals [09/27/22 1404]   BP Pulse Resp Temp SpO2   130/81 89 18 98.4 °F (36.9 °C) 99 %      MAP       --         Physical Exam    Nursing note and vitals reviewed.  Constitutional:   Narrative: Triage vital signs reviewed.     Constitutional: Well-nourished, well-developed.  Responding to internal stimuli.  Very malodorous, " unkempt.     HEENT: Normocephalic, atraumatic. Moist mucous membranes.     Eyes: No conjunctival injection.     Resp: Normal respiratory effort, breathing unlabored.     Cardio: Regular rate and rhythm.     GI: Abdomen non-distended.      MSK: Extremities without any deformities noted. No lower extremity edema.     Skin: Warm and dry. No rashes or lesions noted.     Neuro: Awake and alert. Moves all extremities.             ED Course   Procedures  Labs Reviewed   CBC W/ AUTO DIFFERENTIAL - Abnormal; Notable for the following components:       Result Value    WBC 17.18 (*)     Hemoglobin 12.3 (*)     Hematocrit 37.5 (*)     MCV 79 (*)     MCH 25.9 (*)     Gran # (ANC) 12.2 (*)     Immature Grans (Abs) 0.07 (*)     Mono # 1.4 (*)     All other components within normal limits   COMPREHENSIVE METABOLIC PANEL - Abnormal; Notable for the following components:    Alkaline Phosphatase 48 (*)     All other components within normal limits   TSH - Abnormal; Notable for the following components:    TSH 0.013 (*)     All other components within normal limits   URINALYSIS, REFLEX TO URINE CULTURE - Abnormal; Notable for the following components:    Protein, UA Trace (*)     Ketones, UA Trace (*)     Urobilinogen, UA 2.0-3.0 (*)     All other components within normal limits    Narrative:     Specimen Source->Urine   ACETAMINOPHEN LEVEL - Abnormal; Notable for the following components:    Acetaminophen (Tylenol), Serum <3.0 (*)     All other components within normal limits   ALCOHOL,MEDICAL (ETHANOL)   SARS-COV-2 RNA AMPLIFICATION, QUAL   T4, FREE   DRUG SCREEN PANEL, URINE EMERGENCY          Imaging Results    None          Medications - No data to display  Medical Decision Making:   ED Management:  Patient is actively responding to internal stimuli.  States that he is not suicidal, but has hallucinations, and thinks that somebody or something is out to get him. PEC'd.     After a brief and focused history and physical exam, I do  not find any outward signs of medical catastophe or unstable condition. This type of evaluation does not include illnesses or conditions that may be latent or chronic in nature. For this type of evaluation, the patient will need good health care follow up with primary care. Patient is medically clear for inpatient psychiatric evaluation.                  Medically cleared for psychiatry placement: 9/27/2022  3:39 PM         Clinical Impression:   Final diagnoses:  [R44.3] Hallucinations (Primary)  [Z00.8] Medical clearance for psychiatric admission      ED Disposition Condition    Transfer to Psych Facility Stable          ED Prescriptions    None       Follow-up Information    None          Yulisa Ladd MD  09/28/22 0706

## 2022-09-27 NOTE — PROGRESS NOTES
I accepted handoff from Dr. Ladd for f/u of pending labs. PEC complete. Patient is medically clear for transfer to accepting psychiatric facility.

## 2022-09-28 VITALS
HEART RATE: 65 BPM | DIASTOLIC BLOOD PRESSURE: 74 MMHG | TEMPERATURE: 98 F | RESPIRATION RATE: 18 BRPM | OXYGEN SATURATION: 99 % | SYSTOLIC BLOOD PRESSURE: 118 MMHG

## 2022-09-28 LAB
AMPHET+METHAMPHET UR QL: ABNORMAL
BARBITURATES UR QL SCN>200 NG/ML: NEGATIVE
BENZODIAZ UR QL SCN>200 NG/ML: NEGATIVE
BZE UR QL SCN: NEGATIVE
CANNABINOIDS UR QL SCN: ABNORMAL
CREAT UR-MCNC: 315.6 MG/DL (ref 23–375)
METHADONE UR QL SCN>300 NG/ML: NEGATIVE
OPIATES UR QL SCN: NEGATIVE
PCP UR QL SCN>25 NG/ML: NEGATIVE
TOXICOLOGY INFORMATION: ABNORMAL

## 2022-09-28 NOTE — ED NOTES
Pt is resting comfortably in bed at this time with no complaints or issues noted. Continuous 1:1 sitter present with pt performing q15 min safety checks. All potentially harmful objects are removed from pt room. Bed locked in lowest position with side rails up. Normal respiratory drive noted. Will continue to monitor.

## 2022-10-29 ENCOUNTER — HOSPITAL ENCOUNTER (EMERGENCY)
Facility: HOSPITAL | Age: 27
Discharge: PSYCHIATRIC OR SUBSTANCE USE FACILITY | End: 2022-10-30
Attending: EMERGENCY MEDICINE
Payer: MEDICAID

## 2022-10-29 DIAGNOSIS — Z00.8 MEDICAL CLEARANCE FOR PSYCHIATRIC ADMISSION: ICD-10-CM

## 2022-10-29 DIAGNOSIS — R44.3 HALLUCINATIONS: ICD-10-CM

## 2022-10-29 DIAGNOSIS — Z86.59 HISTORY OF SCHIZOPHRENIA: ICD-10-CM

## 2022-10-29 DIAGNOSIS — E87.6 HYPOKALEMIA: ICD-10-CM

## 2022-10-29 DIAGNOSIS — F19.10 POLYSUBSTANCE ABUSE: Primary | ICD-10-CM

## 2022-10-29 DIAGNOSIS — Z91.199 MEDICALLY NONCOMPLIANT: ICD-10-CM

## 2022-10-29 DIAGNOSIS — S60.229A CONTUSION OF DORSUM OF HAND: ICD-10-CM

## 2022-10-29 PROCEDURE — U0002 COVID-19 LAB TEST NON-CDC: HCPCS | Performed by: EMERGENCY MEDICINE

## 2022-10-29 PROCEDURE — 99285 EMERGENCY DEPT VISIT HI MDM: CPT

## 2022-10-29 PROCEDURE — 99285 EMERGENCY DEPT VISIT HI MDM: CPT | Mod: CS,,, | Performed by: EMERGENCY MEDICINE

## 2022-10-29 PROCEDURE — 81003 URINALYSIS AUTO W/O SCOPE: CPT | Performed by: EMERGENCY MEDICINE

## 2022-10-29 PROCEDURE — 81001 URINALYSIS AUTO W/SCOPE: CPT | Mod: 59 | Performed by: EMERGENCY MEDICINE

## 2022-10-29 PROCEDURE — 80307 DRUG TEST PRSMV CHEM ANLYZR: CPT | Performed by: EMERGENCY MEDICINE

## 2022-10-29 PROCEDURE — 99285 PR EMERGENCY DEPT VISIT,LEVEL V: ICD-10-PCS | Mod: CS,,, | Performed by: EMERGENCY MEDICINE

## 2022-10-30 VITALS
DIASTOLIC BLOOD PRESSURE: 75 MMHG | SYSTOLIC BLOOD PRESSURE: 141 MMHG | OXYGEN SATURATION: 98 % | TEMPERATURE: 98 F | HEART RATE: 93 BPM | RESPIRATION RATE: 20 BRPM

## 2022-10-30 LAB
ALBUMIN SERPL BCP-MCNC: 4.2 G/DL (ref 3.5–5.2)
ALP SERPL-CCNC: 46 U/L (ref 55–135)
ALT SERPL W/O P-5'-P-CCNC: 40 U/L (ref 10–44)
AMPHET+METHAMPHET UR QL: ABNORMAL
ANION GAP SERPL CALC-SCNC: 14 MMOL/L (ref 8–16)
APAP SERPL-MCNC: <3 UG/ML (ref 10–20)
AST SERPL-CCNC: 31 U/L (ref 10–40)
BACTERIA #/AREA URNS AUTO: NORMAL /HPF
BARBITURATES UR QL SCN>200 NG/ML: NEGATIVE
BASOPHILS # BLD AUTO: 0.07 K/UL (ref 0–0.2)
BASOPHILS NFR BLD: 0.4 % (ref 0–1.9)
BENZODIAZ UR QL SCN>200 NG/ML: NEGATIVE
BILIRUB SERPL-MCNC: 1 MG/DL (ref 0.1–1)
BILIRUB UR QL STRIP: NEGATIVE
BUN SERPL-MCNC: 18 MG/DL (ref 6–20)
BZE UR QL SCN: ABNORMAL
CALCIUM SERPL-MCNC: 9.5 MG/DL (ref 8.7–10.5)
CANNABINOIDS UR QL SCN: ABNORMAL
CHLORIDE SERPL-SCNC: 100 MMOL/L (ref 95–110)
CLARITY UR REFRACT.AUTO: CLEAR
CO2 SERPL-SCNC: 22 MMOL/L (ref 23–29)
COLOR UR AUTO: YELLOW
CREAT SERPL-MCNC: 0.9 MG/DL (ref 0.5–1.4)
CREAT UR-MCNC: 310 MG/DL (ref 23–375)
DIFFERENTIAL METHOD: ABNORMAL
EOSINOPHIL # BLD AUTO: 0.5 K/UL (ref 0–0.5)
EOSINOPHIL NFR BLD: 2.3 % (ref 0–8)
ERYTHROCYTE [DISTWIDTH] IN BLOOD BY AUTOMATED COUNT: 13.5 % (ref 11.5–14.5)
EST. GFR  (NO RACE VARIABLE): >60 ML/MIN/1.73 M^2
ETHANOL SERPL-MCNC: <10 MG/DL
GLUCOSE SERPL-MCNC: 120 MG/DL (ref 70–110)
GLUCOSE UR QL STRIP: NEGATIVE
HCT VFR BLD AUTO: 40.1 % (ref 40–54)
HGB BLD-MCNC: 13.4 G/DL (ref 14–18)
HGB UR QL STRIP: NEGATIVE
HYALINE CASTS UR QL AUTO: 0 /LPF
IMM GRANULOCYTES # BLD AUTO: 0.08 K/UL (ref 0–0.04)
IMM GRANULOCYTES NFR BLD AUTO: 0.4 % (ref 0–0.5)
KETONES UR QL STRIP: ABNORMAL
LEUKOCYTE ESTERASE UR QL STRIP: NEGATIVE
LYMPHOCYTES # BLD AUTO: 3.8 K/UL (ref 1–4.8)
LYMPHOCYTES NFR BLD: 19.2 % (ref 18–48)
MCH RBC QN AUTO: 26.4 PG (ref 27–31)
MCHC RBC AUTO-ENTMCNC: 33.4 G/DL (ref 32–36)
MCV RBC AUTO: 79 FL (ref 82–98)
METHADONE UR QL SCN>300 NG/ML: NEGATIVE
MICROSCOPIC COMMENT: NORMAL
MONOCYTES # BLD AUTO: 1.5 K/UL (ref 0.3–1)
MONOCYTES NFR BLD: 7.7 % (ref 4–15)
NEUTROPHILS # BLD AUTO: 13.7 K/UL (ref 1.8–7.7)
NEUTROPHILS NFR BLD: 70 % (ref 38–73)
NITRITE UR QL STRIP: NEGATIVE
NRBC BLD-RTO: 0 /100 WBC
OPIATES UR QL SCN: NEGATIVE
PCP UR QL SCN>25 NG/ML: NEGATIVE
PH UR STRIP: 6 [PH] (ref 5–8)
PLATELET # BLD AUTO: 203 K/UL (ref 150–450)
PMV BLD AUTO: 11.8 FL (ref 9.2–12.9)
POTASSIUM SERPL-SCNC: 3 MMOL/L (ref 3.5–5.1)
PROT SERPL-MCNC: 7.4 G/DL (ref 6–8.4)
PROT UR QL STRIP: ABNORMAL
RBC # BLD AUTO: 5.07 M/UL (ref 4.6–6.2)
RBC #/AREA URNS AUTO: 1 /HPF (ref 0–4)
SARS-COV-2 RDRP RESP QL NAA+PROBE: NEGATIVE
SODIUM SERPL-SCNC: 136 MMOL/L (ref 136–145)
SP GR UR STRIP: >1.03 (ref 1–1.03)
SQUAMOUS #/AREA URNS AUTO: 0 /HPF
TOXICOLOGY INFORMATION: ABNORMAL
TSH SERPL DL<=0.005 MIU/L-ACNC: 1.25 UIU/ML (ref 0.4–4)
URN SPEC COLLECT METH UR: ABNORMAL
VALPROATE SERPL-MCNC: <12.5 UG/ML (ref 50–100)
WBC # BLD AUTO: 19.59 K/UL (ref 3.9–12.7)
WBC #/AREA URNS AUTO: 3 /HPF (ref 0–5)

## 2022-10-30 PROCEDURE — 80164 ASSAY DIPROPYLACETIC ACD TOT: CPT | Performed by: EMERGENCY MEDICINE

## 2022-10-30 PROCEDURE — 80143 DRUG ASSAY ACETAMINOPHEN: CPT | Performed by: EMERGENCY MEDICINE

## 2022-10-30 PROCEDURE — 84443 ASSAY THYROID STIM HORMONE: CPT | Performed by: EMERGENCY MEDICINE

## 2022-10-30 PROCEDURE — 80053 COMPREHEN METABOLIC PANEL: CPT | Performed by: EMERGENCY MEDICINE

## 2022-10-30 PROCEDURE — 85025 COMPLETE CBC W/AUTO DIFF WBC: CPT | Performed by: EMERGENCY MEDICINE

## 2022-10-30 PROCEDURE — 82077 ASSAY SPEC XCP UR&BREATH IA: CPT | Performed by: EMERGENCY MEDICINE

## 2022-10-30 RX ORDER — POTASSIUM CHLORIDE 20 MEQ/1
40 TABLET, EXTENDED RELEASE ORAL ONCE
Status: DISCONTINUED | OUTPATIENT
Start: 2022-10-30 | End: 2022-10-30 | Stop reason: HOSPADM
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
## 2022-10-30 NOTE — ED NOTES
Pt. Belonging : (1) shirt                          (1) jogging pants                          (1) shoes

## 2022-10-30 NOTE — ED NOTES
Bed: 13  Expected date: 10/29/22  Expected time: 11:28 PM  Means of arrival:   Comments:  Jannette

## 2023-01-04 ENCOUNTER — HOSPITAL ENCOUNTER (EMERGENCY)
Facility: HOSPITAL | Age: 28
Discharge: LEFT BEFORE DISCHARGE | End: 2023-01-04
Attending: EMERGENCY MEDICINE
Payer: MEDICAID

## 2023-01-04 VITALS
DIASTOLIC BLOOD PRESSURE: 68 MMHG | HEART RATE: 75 BPM | SYSTOLIC BLOOD PRESSURE: 133 MMHG | RESPIRATION RATE: 15 BRPM | OXYGEN SATURATION: 100 % | TEMPERATURE: 98 F

## 2023-01-04 DIAGNOSIS — Z59.00 HOMELESSNESS: ICD-10-CM

## 2023-01-04 DIAGNOSIS — F19.10 POLYSUBSTANCE ABUSE: ICD-10-CM

## 2023-01-04 DIAGNOSIS — Z76.0 ENCOUNTER FOR MEDICATION REFILL: Primary | ICD-10-CM

## 2023-01-04 PROCEDURE — 99283 EMERGENCY DEPT VISIT LOW MDM: CPT | Mod: ,,, | Performed by: EMERGENCY MEDICINE

## 2023-01-04 PROCEDURE — 99283 PR EMERGENCY DEPT VISIT,LEVEL III: ICD-10-PCS | Mod: ,,, | Performed by: EMERGENCY MEDICINE

## 2023-01-04 PROCEDURE — 99284 EMERGENCY DEPT VISIT MOD MDM: CPT

## 2023-01-04 RX ORDER — OXCARBAZEPINE 300 MG/1
300 TABLET, FILM COATED ORAL 2 TIMES DAILY
Qty: 28 TABLET | Refills: 0 | Status: ON HOLD | OUTPATIENT
Start: 2023-01-04 | End: 2023-06-05 | Stop reason: HOSPADM

## 2023-01-04 RX ORDER — HALOPERIDOL 5 MG/1
5 TABLET ORAL NIGHTLY
Qty: 14 TABLET | Refills: 0 | Status: ON HOLD | OUTPATIENT
Start: 2023-01-04 | End: 2023-06-05 | Stop reason: HOSPADM

## 2023-01-04 RX ORDER — RISPERIDONE 1 MG/1
1 TABLET ORAL 2 TIMES DAILY
Qty: 28 TABLET | Refills: 0 | Status: ON HOLD | OUTPATIENT
Start: 2023-01-04 | End: 2023-06-05 | Stop reason: HOSPADM

## 2023-01-04 SDOH — SOCIAL DETERMINANTS OF HEALTH (SDOH): HOMELESSNESS UNSPECIFIED: Z59.00

## 2023-01-04 NOTE — ED TRIAGE NOTES
"Pt reports tiredness, states " I have been out of my meds and I am homeless and I just want to sleep. " Pt admits to crack this morning, denies ETOH. Pt denies SI/HI, abdominal pain, n/v/d, sob, cp. Pt aaox4, answering questions appropriately     Pt reports he is out of haldol, trileptal, risperdal, xanax and adderall. Unknown of dose   "

## 2023-01-04 NOTE — PLAN OF CARE
SW visited with pt to confirm address and provide information for substance abuse programs.      SW asked pt his address and pt stated that he does live at the address listed on chart.  SW stated that she could provide pt with a Lyft ride to home and pt said he wanted to go to [facility].  SW said she could do that for pt if that is where he wanted to go.    When medically ready and d/c SW to provide Lyft ride for pt to [facility] [address], [address]    Jessica Ingram, EL, MSW, LMSW, RSW   Case Management  [facility]  Email: [email or web address]

## 2023-01-04 NOTE — ED PROVIDER NOTES
"History:  Zack Bhatia is a 27 y.o. male who presents to the ED with Medication Refill (Pt has a history of Schizophrenia and is off of his meds. Pt states, "I need my blood tested." Pt states he is homeless and needs help getting in a rehab facility. )    Described as  27-year-old male with a history of schizophrenia and polysubstance abuse presenting to the emergency department requesting a medication refill and rehabilitation placement.  He reports he is currently homeless and needs a place to stay and would like to stop using drugs.  He denies any acute issues.  Denies any suicidal or homicidal ideation.  Denies any hallucinations.  He reports he needs prescriptions for his Xanax, Adderall, Haldol, Risperdal, and Trileptal.    Review of Systems: All systems reviewed and are negative except as per history of present illness.  Constitutional: Negative for fever.   HENT: Negative for congestion.    Respiratory: Negative for shortness of breath.    Cardiovascular: Negative for chest pain.   Gastrointestinal: Negative for abdominal pain.   Genitourinary: Negative for dysuria.   Musculoskeletal: Negative for myalgias.   Skin: Negative for rash.   Neurological: Negative for focal weakness.   Psychiatric/Behavioral: Negative for memory loss.     Medications:   Discharge Medication List as of 1/4/2023 12:14 PM        CONTINUE these medications which have NOT CHANGED    Details   benztropine (COGENTIN) 0.5 MG tablet Take 0.5 mg by mouth 2 (two) times daily., Until Discontinued, Historical Med      chlorproMAZINE (THORAZINE) 200 MG tablet Take 200 mg by mouth 3 (three) times daily., Until Discontinued, Historical Med      divalproex (DEPAKOTE) 125 MG EC tablet Take 125 mg by mouth 3 (three) times daily., Until Discontinued, Historical Med      hydrOXYzine (VISTARIL) 100 MG capsule Take 100 mg by mouth 4 (four) times daily., Until Discontinued, Historical Med      lithium carbonate 150 MG capsule Take 300 mg by mouth 3 " (three) times daily with meals. , Until Discontinued, Historical Med      !! naloxone (NARCAN) 1 mg/mL injection 2 mg (1 mg per nostril) by Nasal route as needed for opioid overdose; may repeat in 3 to 5 minutes if not effective. Call 911, Print      !! naloxone (NARCAN) 1 mg/mL injection 2 mg (1 mg per nostril) by Nasal route as needed for opioid overdose; may repeat in 3 to 5 minutes if not effective. Call 911, Print      naloxone (NARCAN) 4 mg/actuation Spry 4mg by nasal route as needed for opioid overdose; may repeat every 2-3 minutes in alternating nostrils until medical help arrives. Call 911, Print      quetiapine (SEROQUEL) 400 MG tablet Take 1 tablet (400 mg total) by mouth every evening., Starting 5/27/2014, Until Wed 5/27/15, Print       !! - Potential duplicate medications found. Please discuss with provider.          PMH:   Past Medical History:   Diagnosis Date    Bipolar 1 disorder     Hepatitis C     History of psychiatric care     History of psychiatric hospitalization     Psychiatric exam requested by authority     Psychiatric problem     Schizo affective schizophrenia     Therapy      PSH:   Past Surgical History:   Procedure Laterality Date    ADENOIDECTOMY      EYE SURGERY      KNEE SURGERY      TONSILLECTOMY       Allergies: He is allergic to thorazine [chlorpromazine].  Social History: Marital Status: single. He  reports that he has been smoking cigarettes. He has a 0.75 pack-year smoking history. He does not have any smokeless tobacco history on file.. He  reports no history of alcohol use..       Exam:  VITAL SIGNS:   Vitals:    01/04/23 1121 01/04/23 1122   BP:  133/68   Pulse:  75   Resp: 15 15   Temp:  98.1 °F (36.7 °C)   TempSrc:  Oral   SpO2:  100%     Const: Awake and alert, NAD   Head: Atraumatic  Eyes: Normal Conjunctiva  ENT: Normal External Ears, Nose and Mouth.  Neck: Full range of motion. No meningismus.  Resp: Normal respiratory effort, No distress  Cardio: Equal and intact  distal pulses  Abd: Soft, non tender, non distended.   Skin: No petechiae or rashes  Ext: No cyanosis, or edema  Neur: Awake and alert, Gcs 15  Psych: Normal Mood and Affect, denies SI/hi, denies hallucinations      Medical Decision Makin-year-old male presenting to the emergency department requesting drug rehabilitation and medication refill.    He does not meet criteria for pec at this time as he is not a danger to himself, others, or gravely disabled. Social work provided resources for his homelessness as well as drug rehabilitation.  He did not know what medication dosing he was on.  Most recent ER visit shows Haldol, Trileptal, and Risperdal.  These medications were prescribed, however the patient eloped from the emergency department prior to receiving his paperwork or formal discharge.    Clinical Impression:  1. Encounter for medication refill    2. Polysubstance abuse    3. Homelessness             Medication List        START taking these medications      OXcarbazepine 300 MG Tab  Commonly known as: TRILEPTAL  Take 1 tablet (300 mg total) by mouth 2 (two) times daily. for 14 days            CHANGE how you take these medications      haloperidoL 5 MG tablet  Commonly known as: HALDOL  Take 1 tablet (5 mg total) by mouth every evening. for 14 days  What changed:   medication strength  how much to take  when to take this            CONTINUE taking these medications      risperiDONE 1 MG tablet  Commonly known as: RISPERDAL  Take 1 tablet (1 mg total) by mouth 2 (two) times daily. for 14 days            ASK your doctor about these medications      benztropine 0.5 MG tablet  Commonly known as: COGENTIN     chlorproMAZINE 200 MG tablet  Commonly known as: THORAZINE     divalproex 125 MG EC tablet  Commonly known as: DEPAKOTE     hydrOXYzine 100 MG capsule  Commonly known as: VISTARIL     lithium carbonate 150 MG capsule     * naloxone 1 mg/mL injection  Commonly known as: NARCAN  2 mg (1 mg per nostril)  by Nasal route as needed for opioid overdose; may repeat in 3 to 5 minutes if not effective. Call 911     * naloxone 1 mg/mL injection  Commonly known as: NARCAN  2 mg (1 mg per nostril) by Nasal route as needed for opioid overdose; may repeat in 3 to 5 minutes if not effective. Call 911     * naloxone 4 mg/actuation Spry  Commonly known as: NARCAN  4mg by nasal route as needed for opioid overdose; may repeat every 2-3 minutes in alternating nostrils until medical help arrives. Call 911     QUEtiapine 400 MG tablet  Commonly known as: SEROQUEL  Take 1 tablet (400 mg total) by mouth every evening.           * This list has 3 medication(s) that are the same as other medications prescribed for you. Read the directions carefully, and ask your doctor or other care provider to review them with you.                   Where to Get Your Medications        You can get these medications from any pharmacy    Bring a paper prescription for each of these medications  haloperidoL 5 MG tablet  OXcarbazepine 300 MG Tab  risperiDONE 1 MG tablet         Follow-up Information       Go to rehab. Go today.               [facility] [address].    Specialty: Psychiatry  Why: See your psychiatrist for a refill of your xanax and adderall.  Contact information:  [facility]  [facility] [address] [phone]  Additional information:  [facility] [address], [address]   Please park in [facility] and use [facility] elevator                             Raeann Palomo MD  01/04/23 8119

## 2023-05-29 ENCOUNTER — HOSPITAL ENCOUNTER (EMERGENCY)
Facility: HOSPITAL | Age: 28
Discharge: PSYCHIATRIC OR SUBSTANCE USE FACILITY | End: 2023-05-30
Attending: EMERGENCY MEDICINE
Payer: MEDICAID

## 2023-05-29 DIAGNOSIS — R46.2 BIZARRE BEHAVIOR: Primary | ICD-10-CM

## 2023-05-29 DIAGNOSIS — R45.1 AGITATION: ICD-10-CM

## 2023-05-29 DIAGNOSIS — Z00.8 MEDICAL CLEARANCE FOR PSYCHIATRIC ADMISSION: ICD-10-CM

## 2023-05-29 DIAGNOSIS — Z86.59 HISTORY OF BIPOLAR DISORDER: ICD-10-CM

## 2023-05-29 LAB
ALBUMIN SERPL BCP-MCNC: 4.3 G/DL (ref 3.5–5.2)
ALP SERPL-CCNC: 42 U/L (ref 55–135)
ALT SERPL W/O P-5'-P-CCNC: 20 U/L (ref 10–44)
AMPHET+METHAMPHET UR QL: NEGATIVE
ANION GAP SERPL CALC-SCNC: 11 MMOL/L (ref 8–16)
APAP SERPL-MCNC: <3 UG/ML (ref 10–20)
AST SERPL-CCNC: 21 U/L (ref 10–40)
BARBITURATES UR QL SCN>200 NG/ML: NEGATIVE
BASOPHILS # BLD AUTO: 0.05 K/UL (ref 0–0.2)
BASOPHILS NFR BLD: 0.3 % (ref 0–1.9)
BENZODIAZ UR QL SCN>200 NG/ML: NEGATIVE
BILIRUB SERPL-MCNC: 0.4 MG/DL (ref 0.1–1)
BILIRUB UR QL STRIP: NEGATIVE
BUN SERPL-MCNC: 16 MG/DL (ref 6–20)
BZE UR QL SCN: ABNORMAL
CALCIUM SERPL-MCNC: 9.3 MG/DL (ref 8.7–10.5)
CANNABINOIDS UR QL SCN: ABNORMAL
CHLORIDE SERPL-SCNC: 106 MMOL/L (ref 95–110)
CLARITY UR REFRACT.AUTO: CLEAR
CO2 SERPL-SCNC: 24 MMOL/L (ref 23–29)
COLOR UR AUTO: YELLOW
CREAT SERPL-MCNC: 0.8 MG/DL (ref 0.5–1.4)
CREAT UR-MCNC: 312 MG/DL (ref 23–375)
DIFFERENTIAL METHOD: ABNORMAL
EOSINOPHIL # BLD AUTO: 0.1 K/UL (ref 0–0.5)
EOSINOPHIL NFR BLD: 0.3 % (ref 0–8)
ERYTHROCYTE [DISTWIDTH] IN BLOOD BY AUTOMATED COUNT: 13.8 % (ref 11.5–14.5)
EST. GFR  (NO RACE VARIABLE): >60 ML/MIN/1.73 M^2
ETHANOL SERPL-MCNC: <10 MG/DL
GLUCOSE SERPL-MCNC: 93 MG/DL (ref 70–110)
GLUCOSE UR QL STRIP: NEGATIVE
HCT VFR BLD AUTO: 38.3 % (ref 40–54)
HGB BLD-MCNC: 12.7 G/DL (ref 14–18)
HGB UR QL STRIP: NEGATIVE
IMM GRANULOCYTES # BLD AUTO: 0.05 K/UL (ref 0–0.04)
IMM GRANULOCYTES NFR BLD AUTO: 0.3 % (ref 0–0.5)
KETONES UR QL STRIP: NEGATIVE
LEUKOCYTE ESTERASE UR QL STRIP: NEGATIVE
LYMPHOCYTES # BLD AUTO: 3.1 K/UL (ref 1–4.8)
LYMPHOCYTES NFR BLD: 21.5 % (ref 18–48)
MCH RBC QN AUTO: 26.2 PG (ref 27–31)
MCHC RBC AUTO-ENTMCNC: 33.2 G/DL (ref 32–36)
MCV RBC AUTO: 79 FL (ref 82–98)
METHADONE UR QL SCN>300 NG/ML: NEGATIVE
MONOCYTES # BLD AUTO: 0.9 K/UL (ref 0.3–1)
MONOCYTES NFR BLD: 6.3 % (ref 4–15)
NEUTROPHILS # BLD AUTO: 10.4 K/UL (ref 1.8–7.7)
NEUTROPHILS NFR BLD: 71.3 % (ref 38–73)
NITRITE UR QL STRIP: NEGATIVE
NRBC BLD-RTO: 0 /100 WBC
OPIATES UR QL SCN: NEGATIVE
PCP UR QL SCN>25 NG/ML: NEGATIVE
PH UR STRIP: 6 [PH] (ref 5–8)
PLATELET # BLD AUTO: 216 K/UL (ref 150–450)
PMV BLD AUTO: 11.6 FL (ref 9.2–12.9)
POTASSIUM SERPL-SCNC: 4.1 MMOL/L (ref 3.5–5.1)
PROT SERPL-MCNC: 7.1 G/DL (ref 6–8.4)
PROT UR QL STRIP: NEGATIVE
RBC # BLD AUTO: 4.85 M/UL (ref 4.6–6.2)
SODIUM SERPL-SCNC: 141 MMOL/L (ref 136–145)
SP GR UR STRIP: 1.02 (ref 1–1.03)
TOXICOLOGY INFORMATION: ABNORMAL
TSH SERPL DL<=0.005 MIU/L-ACNC: 2.04 UIU/ML (ref 0.4–4)
URN SPEC COLLECT METH UR: NORMAL
WBC # BLD AUTO: 14.59 K/UL (ref 3.9–12.7)

## 2023-05-29 PROCEDURE — 85025 COMPLETE CBC W/AUTO DIFF WBC: CPT | Performed by: EMERGENCY MEDICINE

## 2023-05-29 PROCEDURE — 80053 COMPREHEN METABOLIC PANEL: CPT | Performed by: EMERGENCY MEDICINE

## 2023-05-29 PROCEDURE — 96374 THER/PROPH/DIAG INJ IV PUSH: CPT

## 2023-05-29 PROCEDURE — 84443 ASSAY THYROID STIM HORMONE: CPT | Performed by: EMERGENCY MEDICINE

## 2023-05-29 PROCEDURE — 99285 EMERGENCY DEPT VISIT HI MDM: CPT | Mod: GC,,, | Performed by: EMERGENCY MEDICINE

## 2023-05-29 PROCEDURE — 93005 ELECTROCARDIOGRAM TRACING: CPT

## 2023-05-29 PROCEDURE — 80307 DRUG TEST PRSMV CHEM ANLYZR: CPT | Performed by: EMERGENCY MEDICINE

## 2023-05-29 PROCEDURE — 63600175 PHARM REV CODE 636 W HCPCS

## 2023-05-29 PROCEDURE — 93010 EKG 12-LEAD: ICD-10-PCS | Mod: ,,, | Performed by: INTERNAL MEDICINE

## 2023-05-29 PROCEDURE — 80143 DRUG ASSAY ACETAMINOPHEN: CPT | Performed by: EMERGENCY MEDICINE

## 2023-05-29 PROCEDURE — 99285 EMERGENCY DEPT VISIT HI MDM: CPT | Mod: 25

## 2023-05-29 PROCEDURE — 81003 URINALYSIS AUTO W/O SCOPE: CPT | Mod: 59 | Performed by: EMERGENCY MEDICINE

## 2023-05-29 PROCEDURE — 93010 ELECTROCARDIOGRAM REPORT: CPT | Mod: ,,, | Performed by: INTERNAL MEDICINE

## 2023-05-29 PROCEDURE — 99285 PR EMERGENCY DEPT VISIT,LEVEL V: ICD-10-PCS | Mod: GC,,, | Performed by: EMERGENCY MEDICINE

## 2023-05-29 PROCEDURE — 96375 TX/PRO/DX INJ NEW DRUG ADDON: CPT

## 2023-05-29 PROCEDURE — 82077 ASSAY SPEC XCP UR&BREATH IA: CPT | Performed by: EMERGENCY MEDICINE

## 2023-05-29 RX ORDER — DIPHENHYDRAMINE HYDROCHLORIDE 50 MG/ML
50 INJECTION INTRAMUSCULAR; INTRAVENOUS EVERY 6 HOURS PRN
Status: DISCONTINUED | OUTPATIENT
Start: 2023-05-29 | End: 2023-05-29

## 2023-05-29 RX ORDER — LORAZEPAM 2 MG/ML
2 INJECTION INTRAMUSCULAR EVERY 6 HOURS PRN
Status: DISCONTINUED | OUTPATIENT
Start: 2023-05-29 | End: 2023-05-30 | Stop reason: HOSPADM

## 2023-05-29 RX ORDER — LORAZEPAM 2 MG/ML
2 INJECTION INTRAMUSCULAR EVERY 6 HOURS PRN
Status: DISCONTINUED | OUTPATIENT
Start: 2023-05-29 | End: 2023-05-29

## 2023-05-29 RX ORDER — HALOPERIDOL 5 MG/ML
5 INJECTION INTRAMUSCULAR EVERY 6 HOURS PRN
Status: DISCONTINUED | OUTPATIENT
Start: 2023-05-29 | End: 2023-05-29

## 2023-05-29 RX ORDER — HALOPERIDOL 5 MG/ML
5 INJECTION INTRAMUSCULAR EVERY 6 HOURS PRN
Status: DISCONTINUED | OUTPATIENT
Start: 2023-05-29 | End: 2023-05-30 | Stop reason: HOSPADM

## 2023-05-29 RX ORDER — DIPHENHYDRAMINE HYDROCHLORIDE 50 MG/ML
50 INJECTION INTRAMUSCULAR; INTRAVENOUS EVERY 6 HOURS PRN
Status: DISCONTINUED | OUTPATIENT
Start: 2023-05-29 | End: 2023-05-30 | Stop reason: HOSPADM

## 2023-05-29 RX ADMIN — LORAZEPAM 2 MG: 2 INJECTION INTRAMUSCULAR; INTRAVENOUS at 09:05

## 2023-05-29 RX ADMIN — DIPHENHYDRAMINE HYDROCHLORIDE 50 MG: 50 INJECTION, SOLUTION INTRAMUSCULAR; INTRAVENOUS at 09:05

## 2023-05-30 VITALS
OXYGEN SATURATION: 99 % | RESPIRATION RATE: 18 BRPM | DIASTOLIC BLOOD PRESSURE: 76 MMHG | HEART RATE: 69 BPM | TEMPERATURE: 98 F | SYSTOLIC BLOOD PRESSURE: 123 MMHG

## 2023-05-30 NOTE — ED NOTES
"Eyes closed, " fuck, what's going on ? I'm dying." Eyes remained closed, rise/fall of chest noted. Side rails remain in the upright position. DVC maintained for safety.  "
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
## 2023-05-30 NOTE — ED NOTES
Resting w/ eyes closed, rise/fall of chest noted. The bed is maintained in the lowest locked position w/ both side rails in the upright position for safety. DVC maintained.

## 2023-05-30 NOTE — ED NOTES
The patient is escorted by this writer, hospital security officers to [facility] ambulance. He was secured onto the gurney by EMS. He departed w/ one belongings bag & one valuable red biohaz bag given to [facility]. He did not wish for anyone to be notified.

## 2023-05-30 NOTE — ED NOTES
Continues to appear asleep w/ eyes closed, talking w/ eyes closed & kicking covers off of legs. DVC is maintained for safety.
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
## 2023-05-30 NOTE — ED NOTES
Resting quietly in bed w/ eyes closed, rise/fall of chest noted. DVC maintained for safety. Awaiting transfer to [facility].

## 2023-05-30 NOTE — ED NOTES
"Talking incoherently stating something about "Nathan." Eyes remain closed. DVC maintained for safety. Awaiting transfer.  "

## 2023-05-30 NOTE — ED NOTES
Belongings consist of: a pair of black tennis shoes, a pair of white socks, a black T-shirt, a pair of blue jeans. Valuables consist of :  35 cents, [place] card, [employer] card.

## 2023-05-31 PROBLEM — B19.20 HEPATITIS C VIRUS INFECTION WITHOUT HEPATIC COMA: Chronic | Status: ACTIVE | Noted: 2023-05-31

## 2023-05-31 PROBLEM — D72.829 LEUKOCYTOSIS: Chronic | Status: ACTIVE | Noted: 2023-05-31

## 2023-05-31 PROBLEM — F14.10 COCAINE ABUSE: Status: ACTIVE | Noted: 2023-05-31

## 2023-05-31 PROBLEM — Z13.9 ENCOUNTER FOR MEDICAL SCREENING EXAMINATION: Status: ACTIVE | Noted: 2023-05-31

## 2023-06-03 PROBLEM — R46.2 BIZARRE BEHAVIOR: Status: ACTIVE | Noted: 2023-06-03

## 2023-06-05 PROBLEM — F19.959 SUBSTANCE-INDUCED PSYCHOTIC DISORDER: Status: ACTIVE | Noted: 2023-06-05

## 2023-06-15 ENCOUNTER — HOSPITAL ENCOUNTER (EMERGENCY)
Facility: HOSPITAL | Age: 28
Discharge: PSYCHIATRIC OR SUBSTANCE USE FACILITY | End: 2023-06-15
Attending: STUDENT IN AN ORGANIZED HEALTH CARE EDUCATION/TRAINING PROGRAM
Payer: MEDICAID

## 2023-06-15 VITALS
RESPIRATION RATE: 16 BRPM | HEART RATE: 82 BPM | TEMPERATURE: 98 F | OXYGEN SATURATION: 100 % | DIASTOLIC BLOOD PRESSURE: 65 MMHG | BODY MASS INDEX: 25.1 KG/M2 | WEIGHT: 170 LBS | SYSTOLIC BLOOD PRESSURE: 120 MMHG

## 2023-06-15 DIAGNOSIS — R45.851 SUICIDAL IDEATION: Primary | ICD-10-CM

## 2023-06-15 LAB
ALBUMIN SERPL BCP-MCNC: 4.1 G/DL (ref 3.5–5.2)
ALP SERPL-CCNC: 41 U/L (ref 55–135)
ALT SERPL W/O P-5'-P-CCNC: 48 U/L (ref 10–44)
AMPHET+METHAMPHET UR QL: NEGATIVE
ANION GAP SERPL CALC-SCNC: 10 MMOL/L (ref 8–16)
APAP SERPL-MCNC: <3 UG/ML (ref 10–20)
AST SERPL-CCNC: 29 U/L (ref 10–40)
BACTERIA #/AREA URNS AUTO: NORMAL /HPF
BARBITURATES UR QL SCN>200 NG/ML: NEGATIVE
BASOPHILS # BLD AUTO: 0.05 K/UL (ref 0–0.2)
BASOPHILS NFR BLD: 0.4 % (ref 0–1.9)
BENZODIAZ UR QL SCN>200 NG/ML: NEGATIVE
BILIRUB SERPL-MCNC: 0.4 MG/DL (ref 0.1–1)
BILIRUB UR QL STRIP: NEGATIVE
BUN SERPL-MCNC: 16 MG/DL (ref 6–20)
BZE UR QL SCN: ABNORMAL
CALCIUM SERPL-MCNC: 9.1 MG/DL (ref 8.7–10.5)
CANNABINOIDS UR QL SCN: ABNORMAL
CHLORIDE SERPL-SCNC: 106 MMOL/L (ref 95–110)
CLARITY UR REFRACT.AUTO: CLEAR
CO2 SERPL-SCNC: 24 MMOL/L (ref 23–29)
COLOR UR AUTO: YELLOW
CREAT SERPL-MCNC: 0.8 MG/DL (ref 0.5–1.4)
CREAT UR-MCNC: >450 MG/DL (ref 23–375)
DIFFERENTIAL METHOD: ABNORMAL
EOSINOPHIL # BLD AUTO: 0 K/UL (ref 0–0.5)
EOSINOPHIL NFR BLD: 0.3 % (ref 0–8)
ERYTHROCYTE [DISTWIDTH] IN BLOOD BY AUTOMATED COUNT: 13.1 % (ref 11.5–14.5)
EST. GFR  (NO RACE VARIABLE): >60 ML/MIN/1.73 M^2
ETHANOL SERPL-MCNC: <10 MG/DL
GLUCOSE SERPL-MCNC: 98 MG/DL (ref 70–110)
GLUCOSE UR QL STRIP: NEGATIVE
HCT VFR BLD AUTO: 37.8 % (ref 40–54)
HGB BLD-MCNC: 12.4 G/DL (ref 14–18)
HGB UR QL STRIP: NEGATIVE
HYALINE CASTS UR QL AUTO: 1 /LPF
IMM GRANULOCYTES # BLD AUTO: 0.04 K/UL (ref 0–0.04)
IMM GRANULOCYTES NFR BLD AUTO: 0.3 % (ref 0–0.5)
KETONES UR QL STRIP: ABNORMAL
LEUKOCYTE ESTERASE UR QL STRIP: NEGATIVE
LYMPHOCYTES # BLD AUTO: 2.8 K/UL (ref 1–4.8)
LYMPHOCYTES NFR BLD: 23.8 % (ref 18–48)
MCH RBC QN AUTO: 26.2 PG (ref 27–31)
MCHC RBC AUTO-ENTMCNC: 32.8 G/DL (ref 32–36)
MCV RBC AUTO: 80 FL (ref 82–98)
METHADONE UR QL SCN>300 NG/ML: NEGATIVE
MICROSCOPIC COMMENT: NORMAL
MONOCYTES # BLD AUTO: 0.9 K/UL (ref 0.3–1)
MONOCYTES NFR BLD: 7.1 % (ref 4–15)
NEUTROPHILS # BLD AUTO: 8.1 K/UL (ref 1.8–7.7)
NEUTROPHILS NFR BLD: 68.1 % (ref 38–73)
NITRITE UR QL STRIP: NEGATIVE
NRBC BLD-RTO: 0 /100 WBC
OPIATES UR QL SCN: NEGATIVE
PCP UR QL SCN>25 NG/ML: NEGATIVE
PH UR STRIP: 6 [PH] (ref 5–8)
PLATELET # BLD AUTO: 190 K/UL (ref 150–450)
PMV BLD AUTO: 11.8 FL (ref 9.2–12.9)
POTASSIUM SERPL-SCNC: 3.4 MMOL/L (ref 3.5–5.1)
PROT SERPL-MCNC: 6.8 G/DL (ref 6–8.4)
PROT UR QL STRIP: ABNORMAL
RBC # BLD AUTO: 4.74 M/UL (ref 4.6–6.2)
RBC #/AREA URNS AUTO: 2 /HPF (ref 0–4)
SODIUM SERPL-SCNC: 140 MMOL/L (ref 136–145)
SP GR UR STRIP: >1.03 (ref 1–1.03)
TOXICOLOGY INFORMATION: ABNORMAL
TSH SERPL DL<=0.005 MIU/L-ACNC: 0.68 UIU/ML (ref 0.4–4)
URN SPEC COLLECT METH UR: ABNORMAL
WBC # BLD AUTO: 11.91 K/UL (ref 3.9–12.7)
WBC #/AREA URNS AUTO: 1 /HPF (ref 0–5)

## 2023-06-15 PROCEDURE — 80143 DRUG ASSAY ACETAMINOPHEN: CPT

## 2023-06-15 PROCEDURE — 99285 PR EMERGENCY DEPT VISIT,LEVEL V: ICD-10-PCS | Mod: ,,, | Performed by: STUDENT IN AN ORGANIZED HEALTH CARE EDUCATION/TRAINING PROGRAM

## 2023-06-15 PROCEDURE — 81001 URINALYSIS AUTO W/SCOPE: CPT

## 2023-06-15 PROCEDURE — 84443 ASSAY THYROID STIM HORMONE: CPT

## 2023-06-15 PROCEDURE — 85025 COMPLETE CBC W/AUTO DIFF WBC: CPT

## 2023-06-15 PROCEDURE — 82077 ASSAY SPEC XCP UR&BREATH IA: CPT

## 2023-06-15 PROCEDURE — 80307 DRUG TEST PRSMV CHEM ANLYZR: CPT

## 2023-06-15 PROCEDURE — 99285 EMERGENCY DEPT VISIT HI MDM: CPT

## 2023-06-15 PROCEDURE — 99285 EMERGENCY DEPT VISIT HI MDM: CPT | Mod: ,,, | Performed by: STUDENT IN AN ORGANIZED HEALTH CARE EDUCATION/TRAINING PROGRAM

## 2023-06-15 PROCEDURE — 80053 COMPREHEN METABOLIC PANEL: CPT

## 2023-06-15 NOTE — ED PROVIDER NOTES
"Encounter Date: 6/15/2023       History     Chief Complaint   Patient presents with    Suicidal     Pt states he was suicidal after being threatened to be brought to [facility] after being caught stealing a banana from a gas station.     27-year-old male with past medical history of Bipolar 1 disorder/ schizoaffective disorder, Hepatitis C, History of psychiatric care now brought in by EMS with concern for suicidal ideation.  As per report, patient expressed suicidal ideation when he was caught stealing a banana from a gas station.     Patient is a poor historian and gives limited history, but endorses experiencing homelessness and thinks he "may need rehab for his drugs".  When probed about why he is here patient just says "I dunno." When asked if he is still experiencing suicidal ideation he says " I dunno." Patient does not answer when last he used drugs or alcohol.     The history is provided by the patient.   Review of patient's allergies indicates:   Allergen Reactions    Thorazine [chlorpromazine]      "get overheated when I go outside after taking"     Past Medical History:   Diagnosis Date    Bipolar 1 disorder     Hepatitis C     History of psychiatric care     History of psychiatric hospitalization     Psychiatric exam requested by authority     Psychiatric problem     Schizo affective schizophrenia     Therapy      Past Surgical History:   Procedure Laterality Date    ADENOIDECTOMY      EYE SURGERY      KNEE SURGERY      TONSILLECTOMY       Family History   Problem Relation Age of Onset    Heart disease Mother     Diabetes Maternal Aunt      Social History     Tobacco Use    Smoking status: Every Day     Packs/day: 0.25     Years: 3.00     Pack years: 0.75     Types: Cigarettes   Substance Use Topics    Alcohol use: No    Drug use: Yes     Types: Marijuana, Cocaine     Comment: heroin     Review of Systems  See HPI    Physical Exam     Initial Vitals   BP Pulse Resp Temp SpO2   06/15/23 0243 06/15/23 0243 " 06/15/23 0243 06/15/23 0621 06/15/23 0600   129/87 79 14 98.6 °F (37 °C) 100 %      MAP       --                Physical Exam  Gen: AxOx3, well nourished, appears stated age, no pallor, no jaundice, appears well hydrated  Eye: EOMI, no scleral icterus, no periorbital edema or ecchymosis  Head: Normocephalic, atraumatic, no lesions, scalp appears normal  ENT: Neck supple, no stridor, no masses, no drooling or voice changes  CVS: All distal pulses intact with normal rate and rhythm, no JVD, normal S1/S2, no murmur  Pulm: Normal breath sounds, no wheezes, rales or rhonchi, no increased work of breathing  Abd:  Nondistended, soft, nontender, no organomegaly, no CVAT  Ext: No edema, no lesions, rashes, or deformity  Neuro: GCS15, moving all extremities, gait intact, face grossly symmetric  Psych:   Appearance:  Disheveled  Behavior:  Withdrawn  Cognition:  Oriented to time person and place  Speech:  Poverty of speech  Thought:  Grossly linear  Mood/affect:  Depressed  Psychomotor:  No agitation  Insight:  Poor  Hallucinations:  None  Delusions:  None  Suicidality:  Endorses suicidal thoughts.  Does not endorse plan.      ED Course   Procedures  Labs Reviewed   CBC W/ AUTO DIFFERENTIAL - Abnormal; Notable for the following components:       Result Value    Hemoglobin 12.4 (*)     Hematocrit 37.8 (*)     MCV 80 (*)     MCH 26.2 (*)     Gran # (ANC) 8.1 (*)     All other components within normal limits   COMPREHENSIVE METABOLIC PANEL - Abnormal; Notable for the following components:    Potassium 3.4 (*)     Alkaline Phosphatase 41 (*)     ALT 48 (*)     All other components within normal limits   URINALYSIS, REFLEX TO URINE CULTURE - Abnormal; Notable for the following components:    Specific Gravity, UA >1.030 (*)     Protein, UA 1+ (*)     Ketones, UA Trace (*)     All other components within normal limits    Narrative:     Specimen Source->Urine   DRUG SCREEN PANEL, URINE EMERGENCY - Abnormal; Notable for the following  components:    Creatinine, Urine >450.0 (*)     All other components within normal limits    Narrative:     Specimen Source->Urine   ACETAMINOPHEN LEVEL - Abnormal; Notable for the following components:    Acetaminophen (Tylenol), Serum <3.0 (*)     All other components within normal limits   TSH   ALCOHOL,MEDICAL (ETHANOL)   URINALYSIS MICROSCOPIC    Narrative:     Specimen Source->Urine          Imaging Results    None          Medications - No data to display  Medical Decision Making:   Initial Assessment:   27-year-old male with suicidal thoughts. Patient is able to vocalise, breathing spontaneously, hemodynamically stable, oriented, moving all 4 limbs spontaneously. Patient is able to vocalise, breathing spontaneously, hemodynamically stable, oriented, moving all 4 limbs spontaneously.  Examination consistent with withdrawn and depressed psychiatric examination.  Differential Diagnosis:   Suicidal ideation secondary to depression/schizophrenia  Malingering  Drug overdose  Drug withdrawal  Electrolyte/hormonal imbalance  Clinical Tests:   Lab Tests: Ordered and Reviewed  Radiological Study: Ordered and Reviewed  ED Management:  Given patient's inability to give proper history and report expressing suicidal ideation I was concerned about patient's mental health in setting of his previous psychiatric hospitalizations.  I decided to pec patient and obtain routine psychiatric labs which were grossly benign.  Patient was stable in emergency department.  Patient was medically cleared and transferred to psychiatric facility for further psychiatric care.          Attending Attestation:   Physician Attestation Statement for Resident:  As the supervising MD   Physician Attestation Statement: I have personally seen and examined this patient.   I agree with the above history.  -:   As the supervising MD I agree with the above PE.     As the supervising MD I agree with the above treatment, course, plan, and disposition.                   ED Course as of 06/15/23 0642   Thu Partha 15, 2023   0530 CBC auto differential(!)  Benign [PM]   0530 Comprehensive metabolic panel(!)  Benign [PM]   0530 Acetaminophen level(!) [PM]   0530 Alcohol, Serum: <10 [PM]   0531 TSH: 0.681 [PM]      ED Course User Index  [PM] Zofia Coello MD       Medically cleared for psychiatry placement: 6/15/2023  5:38 AM         Clinical Impression:   Final diagnoses:  [R45.851] Suicidal ideation (Primary)        ED Disposition Condition    Transfer to Psych Facility Stable          ED Prescriptions    None       Follow-up Information    None          Zofia Coello MD  Resident  06/15/23 0541       Dmitriy Hanson MD  06/15/23 0642

## 2023-06-15 NOTE — ED TRIAGE NOTES
"Zack Bhatia, a 27 y.o. male presents to the ED w/ complaint of being suicidal after being caught stealing a banana from the gas station.     Triage note:  Chief Complaint   Patient presents with    Suicidal     Pt states he was suicidal after being threatened to be brought to care home after being caught stealing a banana from a gas station.     Review of patient's allergies indicates:   Allergen Reactions    Thorazine [chlorpromazine]      "get overheated when I go outside after taking"     Past Medical History:   Diagnosis Date    Bipolar 1 disorder     Hepatitis C     History of psychiatric care     History of psychiatric hospitalization     Psychiatric exam requested by authority     Psychiatric problem     Schizo affective schizophrenia     Therapy     Patient identifiers for Zack Bhatia checked and correct.    LOC: The patient is awake, alert and aware of environment with an appropriate affect, the patient is oriented x 4 and speaking appropriately.    APPEARANCE: Patient resting comfortably and in no acute distress, patient is clean and well groomed, patient's clothing is properly fastened.    SKIN: The skin is warm and dry, color consistent with ethnicity, patient has normal skin turgor and moist mucus membranes, skin intact, no breakdown or bruising noted.    MUSCULOSKELETAL: Patient moving all extremities well, no obvious swelling or deformities noted.    RESPIRATORY: Airway is open and patent, respirations are spontaneous and even, patient has a normal effort and rate.    CARDIAC: Patient has a normal rate and rhythm, no periphreal edema noted, capillary refill < 3 seconds.    ABDOMEN: Soft and non tender to palpation, no distention noted. Patient denies any nausea, vomiting, diarrhea, or constipation.     NEUROLOGIC: Eyes open spontaneously, PERRL, behavior appropriate to situation, follows commands, facial expression symmetrical, bilateral hand grasp equal and even, purposeful motor response noted, " normal sensation in all extremities.     HEENT: No abnormalities noted. White sclera and pupils equal round and reactive to light. Denies headache, dizziness.     : Pt voids independently, denies dysuria, hematuria, frequency.

## 2023-06-15 NOTE — ED NOTES
"The patient is transferred from room 19 to 26. He is lying supine in bed w/ eyes closed, rise/fall of chest noted. The bed is maintained in the lowest locked position w/ the side rails in the upright position for safety. He was aroused by call of name. He refused to answer any questions statin, " I'm done answering questions. I remember you, I'm scared of you." The patient is informed that a urine specimen is needed, po fluids provided. Sitter maintaining DVC for safety.  "

## 2023-06-15 NOTE — ED NOTES
Pt changed and dressed into blue scrubs per hospital policy. Pt wanded by security. Pts belongings collected, inventoried, and placed in closet as follows: one pair of birkenstocks, one pair of red and black pants, and one black shirt. Pt does not have any valuables.

## 2023-06-15 NOTE — ED NOTES
Assumed pt care, he is lying on the stretcher resting NAD noted. Pt dressed in paper scrubs all belongings secured. Pt has a signed PEC at the desk. Pt awaiting UDS results to begin with psych placement. EDT at the bedside DVC maintained.

## 2023-06-15 NOTE — ED NOTES
Pt escorted off of the unit on a stretcher by ED and security staff. Pt's PEC, CEC, transfer form, and all belongings given to [facility] staff. Pt remains calm and cooperative for the transfer.

## 2023-07-12 ENCOUNTER — HOSPITAL ENCOUNTER (EMERGENCY)
Facility: HOSPITAL | Age: 28
Discharge: PSYCHIATRIC OR SUBSTANCE USE FACILITY | End: 2023-07-13
Attending: EMERGENCY MEDICINE
Payer: MEDICAID

## 2023-07-12 DIAGNOSIS — R46.2 BIZARRE BEHAVIOR: ICD-10-CM

## 2023-07-12 DIAGNOSIS — T07.XXXA ABRASIONS OF MULTIPLE SITES: ICD-10-CM

## 2023-07-12 DIAGNOSIS — F23 ACUTE PSYCHOSIS: Primary | ICD-10-CM

## 2023-07-12 DIAGNOSIS — Z23 NEED FOR DIPHTHERIA, TETANUS, PERTUSSIS, AND HIB VACCINATION: ICD-10-CM

## 2023-07-12 LAB
BASOPHILS # BLD AUTO: 0.05 K/UL (ref 0–0.2)
BASOPHILS NFR BLD: 0.3 % (ref 0–1.9)
DIFFERENTIAL METHOD: ABNORMAL
EOSINOPHIL # BLD AUTO: 0.1 K/UL (ref 0–0.5)
EOSINOPHIL NFR BLD: 0.8 % (ref 0–8)
ERYTHROCYTE [DISTWIDTH] IN BLOOD BY AUTOMATED COUNT: 13.3 % (ref 11.5–14.5)
ETHANOL SERPL-MCNC: <10 MG/DL
HCT VFR BLD AUTO: 37.3 % (ref 40–54)
HGB BLD-MCNC: 12.1 G/DL (ref 14–18)
IMM GRANULOCYTES # BLD AUTO: 0.15 K/UL (ref 0–0.04)
IMM GRANULOCYTES NFR BLD AUTO: 1 % (ref 0–0.5)
LYMPHOCYTES # BLD AUTO: 3 K/UL (ref 1–4.8)
LYMPHOCYTES NFR BLD: 20.9 % (ref 18–48)
MCH RBC QN AUTO: 26.1 PG (ref 27–31)
MCHC RBC AUTO-ENTMCNC: 32.4 G/DL (ref 32–36)
MCV RBC AUTO: 80 FL (ref 82–98)
MONOCYTES # BLD AUTO: 1.1 K/UL (ref 0.3–1)
MONOCYTES NFR BLD: 7.7 % (ref 4–15)
NEUTROPHILS # BLD AUTO: 10 K/UL (ref 1.8–7.7)
NEUTROPHILS NFR BLD: 69.3 % (ref 38–73)
NRBC BLD-RTO: 0 /100 WBC
PLATELET # BLD AUTO: 211 K/UL (ref 150–450)
PMV BLD AUTO: 11 FL (ref 9.2–12.9)
RBC # BLD AUTO: 4.64 M/UL (ref 4.6–6.2)
WBC # BLD AUTO: 14.45 K/UL (ref 3.9–12.7)

## 2023-07-12 PROCEDURE — 99285 EMERGENCY DEPT VISIT HI MDM: CPT

## 2023-07-12 PROCEDURE — 82077 ASSAY SPEC XCP UR&BREATH IA: CPT

## 2023-07-12 PROCEDURE — 84443 ASSAY THYROID STIM HORMONE: CPT

## 2023-07-12 PROCEDURE — 80053 COMPREHEN METABOLIC PANEL: CPT

## 2023-07-12 PROCEDURE — 80307 DRUG TEST PRSMV CHEM ANLYZR: CPT

## 2023-07-12 PROCEDURE — 81003 URINALYSIS AUTO W/O SCOPE: CPT | Mod: 59

## 2023-07-12 PROCEDURE — 85025 COMPLETE CBC W/AUTO DIFF WBC: CPT

## 2023-07-12 PROCEDURE — 80143 DRUG ASSAY ACETAMINOPHEN: CPT

## 2023-07-12 RX ORDER — ACETAMINOPHEN 500 MG
1000 TABLET ORAL
Status: COMPLETED | OUTPATIENT
Start: 2023-07-13 | End: 2023-07-13

## 2023-07-13 VITALS
SYSTOLIC BLOOD PRESSURE: 125 MMHG | OXYGEN SATURATION: 99 % | HEART RATE: 58 BPM | TEMPERATURE: 98 F | RESPIRATION RATE: 18 BRPM | DIASTOLIC BLOOD PRESSURE: 65 MMHG

## 2023-07-13 LAB
ALBUMIN SERPL BCP-MCNC: 3.8 G/DL (ref 3.5–5.2)
ALP SERPL-CCNC: 42 U/L (ref 55–135)
ALT SERPL W/O P-5'-P-CCNC: 48 U/L (ref 10–44)
AMPHET+METHAMPHET UR QL: ABNORMAL
ANION GAP SERPL CALC-SCNC: 8 MMOL/L (ref 8–16)
APAP SERPL-MCNC: <3 UG/ML (ref 10–20)
AST SERPL-CCNC: 31 U/L (ref 10–40)
BARBITURATES UR QL SCN>200 NG/ML: NEGATIVE
BENZODIAZ UR QL SCN>200 NG/ML: NEGATIVE
BILIRUB SERPL-MCNC: 0.3 MG/DL (ref 0.1–1)
BILIRUB UR QL STRIP: NEGATIVE
BUN SERPL-MCNC: 8 MG/DL (ref 6–20)
BZE UR QL SCN: NEGATIVE
CALCIUM SERPL-MCNC: 9.1 MG/DL (ref 8.7–10.5)
CANNABINOIDS UR QL SCN: ABNORMAL
CHLORIDE SERPL-SCNC: 104 MMOL/L (ref 95–110)
CLARITY UR REFRACT.AUTO: CLEAR
CO2 SERPL-SCNC: 25 MMOL/L (ref 23–29)
COLOR UR AUTO: YELLOW
CREAT SERPL-MCNC: 0.8 MG/DL (ref 0.5–1.4)
CREAT UR-MCNC: 135 MG/DL (ref 23–375)
EST. GFR  (NO RACE VARIABLE): >60 ML/MIN/1.73 M^2
GLUCOSE SERPL-MCNC: 95 MG/DL (ref 70–110)
GLUCOSE UR QL STRIP: NEGATIVE
HGB UR QL STRIP: NEGATIVE
KETONES UR QL STRIP: NEGATIVE
LEUKOCYTE ESTERASE UR QL STRIP: NEGATIVE
METHADONE UR QL SCN>300 NG/ML: NEGATIVE
NITRITE UR QL STRIP: NEGATIVE
OPIATES UR QL SCN: NEGATIVE
PCP UR QL SCN>25 NG/ML: NEGATIVE
PH UR STRIP: 7 [PH] (ref 5–8)
POTASSIUM SERPL-SCNC: 3.7 MMOL/L (ref 3.5–5.1)
PROT SERPL-MCNC: 6.5 G/DL (ref 6–8.4)
PROT UR QL STRIP: NEGATIVE
SODIUM SERPL-SCNC: 137 MMOL/L (ref 136–145)
SP GR UR STRIP: 1.01 (ref 1–1.03)
TOXICOLOGY INFORMATION: ABNORMAL
TSH SERPL DL<=0.005 MIU/L-ACNC: 1.28 UIU/ML (ref 0.4–4)
URN SPEC COLLECT METH UR: NORMAL

## 2023-07-13 PROCEDURE — 90471 IMMUNIZATION ADMIN: CPT

## 2023-07-13 PROCEDURE — 90715 TDAP VACCINE 7 YRS/> IM: CPT

## 2023-07-13 PROCEDURE — 63600175 PHARM REV CODE 636 W HCPCS

## 2023-07-13 PROCEDURE — 25000003 PHARM REV CODE 250

## 2023-07-13 RX ORDER — BACITRACIN ZINC 500 UNIT/G
1 OINTMENT (GRAM) TOPICAL
Status: DISCONTINUED | OUTPATIENT
Start: 2023-07-13 | End: 2023-07-13 | Stop reason: HOSPADM

## 2023-07-13 RX ORDER — HALOPERIDOL 5 MG/1
5 TABLET ORAL
Status: COMPLETED | OUTPATIENT
Start: 2023-07-13 | End: 2023-07-13

## 2023-07-13 RX ADMIN — HALOPERIDOL 5 MG: 5 TABLET ORAL at 01:07

## 2023-07-13 RX ADMIN — ACETAMINOPHEN 1000 MG: 500 TABLET ORAL at 12:07

## 2023-07-13 RX ADMIN — TETANUS TOXOID, REDUCED DIPHTHERIA TOXOID AND ACELLULAR PERTUSSIS VACCINE, ADSORBED 0.5 ML: 5; 2.5; 8; 8; 2.5 SUSPENSION INTRAMUSCULAR at 12:07

## 2023-07-13 NOTE — ED NOTES
Continues to appear asleep, eyes closed, rise/fall of chest noted. Side rails remain in the upright position. DVC maintained w/ sitter present.
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
## 2023-07-13 NOTE — ED NOTES
Lying prone, rise/fall of chest noted., opened eyes to call of name. Continues to maintain a calm demeanor, cooperative. Declines any needs, no complaints or concerns. Informed of his pending transfer to [facility]

## 2023-07-13 NOTE — ED NOTES
"Patient is laughing aloud. He endorses AH. Informed will get medication to rid the voices. Patient acknowledged understanding stating, " yes ma'am."  " Elevated cardiac enzymes
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
## 2023-07-13 NOTE — ED NOTES
Dr. Zambrano @ bedside. Appears easily agitated when aroused from sleep. Awaiting transfer to [facility],

## 2023-07-13 NOTE — ED NOTES
[facility] is on site, patient is escorted to the restroom & then escorted to the gurney. He has one belongings bag that is given to [facility] staff. He departed w/o any difficulty, concerns or complaints. Another attempt to contact mother, Coni @ [phone], no answer, message left to call the ED.

## 2023-07-13 NOTE — ED NOTES
The patient's mother, Coni was called @ [phone], no answer, message left to call the ED, informed that the call was not an emergency call.

## 2023-07-13 NOTE — ED NOTES
Patient remains cooperative. Per order wounds to hands cleaned w/ H202, Bacitracin w/ 2x2 covering wounds & a lite Kerlix wrap to secure 2x2 gauze. Patient resumed resting position in bed. DVC maintained for safety.
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
## 2023-09-03 ENCOUNTER — HOSPITAL ENCOUNTER (EMERGENCY)
Facility: HOSPITAL | Age: 28
Discharge: PSYCHIATRIC OR SUBSTANCE USE FACILITY | End: 2023-09-04
Attending: STUDENT IN AN ORGANIZED HEALTH CARE EDUCATION/TRAINING PROGRAM
Payer: MEDICAID

## 2023-09-03 DIAGNOSIS — Z59.00 HOMELESSNESS: ICD-10-CM

## 2023-09-03 DIAGNOSIS — F19.10 POLYSUBSTANCE ABUSE: ICD-10-CM

## 2023-09-03 DIAGNOSIS — Z00.8 MEDICAL CLEARANCE FOR PSYCHIATRIC ADMISSION: Primary | ICD-10-CM

## 2023-09-03 LAB
ALBUMIN SERPL BCP-MCNC: 3.9 G/DL (ref 3.5–5.2)
ALP SERPL-CCNC: 51 U/L (ref 55–135)
ALT SERPL W/O P-5'-P-CCNC: 63 U/L (ref 10–44)
AMPHET+METHAMPHET UR QL: ABNORMAL
ANION GAP SERPL CALC-SCNC: 11 MMOL/L (ref 8–16)
APAP SERPL-MCNC: <3 UG/ML (ref 10–20)
AST SERPL-CCNC: 39 U/L (ref 10–40)
BACTERIA #/AREA URNS AUTO: NORMAL /HPF
BARBITURATES UR QL SCN>200 NG/ML: NEGATIVE
BASOPHILS # BLD AUTO: 0.05 K/UL (ref 0–0.2)
BASOPHILS NFR BLD: 0.4 % (ref 0–1.9)
BENZODIAZ UR QL SCN>200 NG/ML: NEGATIVE
BILIRUB SERPL-MCNC: 0.6 MG/DL (ref 0.1–1)
BILIRUB UR QL STRIP: NEGATIVE
BUN SERPL-MCNC: 16 MG/DL (ref 6–20)
BZE UR QL SCN: ABNORMAL
CALCIUM SERPL-MCNC: 9 MG/DL (ref 8.7–10.5)
CANNABINOIDS UR QL SCN: ABNORMAL
CHLORIDE SERPL-SCNC: 106 MMOL/L (ref 95–110)
CLARITY UR REFRACT.AUTO: CLEAR
CO2 SERPL-SCNC: 23 MMOL/L (ref 23–29)
COLOR UR AUTO: YELLOW
CREAT SERPL-MCNC: 0.8 MG/DL (ref 0.5–1.4)
CREAT UR-MCNC: 318 MG/DL (ref 23–375)
DIFFERENTIAL METHOD: ABNORMAL
EOSINOPHIL # BLD AUTO: 0.1 K/UL (ref 0–0.5)
EOSINOPHIL NFR BLD: 1.1 % (ref 0–8)
ERYTHROCYTE [DISTWIDTH] IN BLOOD BY AUTOMATED COUNT: 13.7 % (ref 11.5–14.5)
EST. GFR  (NO RACE VARIABLE): >60 ML/MIN/1.73 M^2
ETHANOL SERPL-MCNC: <10 MG/DL
GLUCOSE SERPL-MCNC: 110 MG/DL (ref 70–110)
GLUCOSE UR QL STRIP: NEGATIVE
HCT VFR BLD AUTO: 38.9 % (ref 40–54)
HGB BLD-MCNC: 12.6 G/DL (ref 14–18)
HGB UR QL STRIP: NEGATIVE
HYALINE CASTS UR QL AUTO: 0 /LPF
IMM GRANULOCYTES # BLD AUTO: 0.03 K/UL (ref 0–0.04)
IMM GRANULOCYTES NFR BLD AUTO: 0.3 % (ref 0–0.5)
KETONES UR QL STRIP: ABNORMAL
LEUKOCYTE ESTERASE UR QL STRIP: NEGATIVE
LYMPHOCYTES # BLD AUTO: 3 K/UL (ref 1–4.8)
LYMPHOCYTES NFR BLD: 25.5 % (ref 18–48)
MCH RBC QN AUTO: 25.6 PG (ref 27–31)
MCHC RBC AUTO-ENTMCNC: 32.4 G/DL (ref 32–36)
MCV RBC AUTO: 79 FL (ref 82–98)
METHADONE UR QL SCN>300 NG/ML: NEGATIVE
MICROSCOPIC COMMENT: NORMAL
MONOCYTES # BLD AUTO: 1 K/UL (ref 0.3–1)
MONOCYTES NFR BLD: 8.4 % (ref 4–15)
NEUTROPHILS # BLD AUTO: 7.5 K/UL (ref 1.8–7.7)
NEUTROPHILS NFR BLD: 64.3 % (ref 38–73)
NITRITE UR QL STRIP: NEGATIVE
NRBC BLD-RTO: 0 /100 WBC
OPIATES UR QL SCN: NEGATIVE
PCP UR QL SCN>25 NG/ML: NEGATIVE
PH UR STRIP: 6 [PH] (ref 5–8)
PLATELET # BLD AUTO: 188 K/UL (ref 150–450)
PMV BLD AUTO: 11.7 FL (ref 9.2–12.9)
POTASSIUM SERPL-SCNC: 3.8 MMOL/L (ref 3.5–5.1)
PROT SERPL-MCNC: 6.9 G/DL (ref 6–8.4)
PROT UR QL STRIP: ABNORMAL
RBC # BLD AUTO: 4.92 M/UL (ref 4.6–6.2)
RBC #/AREA URNS AUTO: 1 /HPF (ref 0–4)
SODIUM SERPL-SCNC: 140 MMOL/L (ref 136–145)
SP GR UR STRIP: >1.03 (ref 1–1.03)
SQUAMOUS #/AREA URNS AUTO: 0 /HPF
TOXICOLOGY INFORMATION: ABNORMAL
TSH SERPL DL<=0.005 MIU/L-ACNC: 1.82 UIU/ML (ref 0.4–4)
URN SPEC COLLECT METH UR: ABNORMAL
WBC # BLD AUTO: 11.65 K/UL (ref 3.9–12.7)
WBC #/AREA URNS AUTO: 1 /HPF (ref 0–5)

## 2023-09-03 PROCEDURE — 99285 EMERGENCY DEPT VISIT HI MDM: CPT

## 2023-09-03 PROCEDURE — 80053 COMPREHEN METABOLIC PANEL: CPT | Performed by: STUDENT IN AN ORGANIZED HEALTH CARE EDUCATION/TRAINING PROGRAM

## 2023-09-03 PROCEDURE — 80307 DRUG TEST PRSMV CHEM ANLYZR: CPT | Performed by: STUDENT IN AN ORGANIZED HEALTH CARE EDUCATION/TRAINING PROGRAM

## 2023-09-03 PROCEDURE — 63600175 PHARM REV CODE 636 W HCPCS: Performed by: STUDENT IN AN ORGANIZED HEALTH CARE EDUCATION/TRAINING PROGRAM

## 2023-09-03 PROCEDURE — 96372 THER/PROPH/DIAG INJ SC/IM: CPT | Performed by: STUDENT IN AN ORGANIZED HEALTH CARE EDUCATION/TRAINING PROGRAM

## 2023-09-03 PROCEDURE — 81001 URINALYSIS AUTO W/SCOPE: CPT | Performed by: STUDENT IN AN ORGANIZED HEALTH CARE EDUCATION/TRAINING PROGRAM

## 2023-09-03 PROCEDURE — 80143 DRUG ASSAY ACETAMINOPHEN: CPT | Performed by: STUDENT IN AN ORGANIZED HEALTH CARE EDUCATION/TRAINING PROGRAM

## 2023-09-03 PROCEDURE — 84443 ASSAY THYROID STIM HORMONE: CPT | Performed by: STUDENT IN AN ORGANIZED HEALTH CARE EDUCATION/TRAINING PROGRAM

## 2023-09-03 PROCEDURE — 82077 ASSAY SPEC XCP UR&BREATH IA: CPT | Performed by: STUDENT IN AN ORGANIZED HEALTH CARE EDUCATION/TRAINING PROGRAM

## 2023-09-03 PROCEDURE — 85025 COMPLETE CBC W/AUTO DIFF WBC: CPT | Performed by: STUDENT IN AN ORGANIZED HEALTH CARE EDUCATION/TRAINING PROGRAM

## 2023-09-03 RX ORDER — LORAZEPAM 2 MG/ML
2 INJECTION INTRAMUSCULAR ONCE AS NEEDED
Status: COMPLETED | OUTPATIENT
Start: 2023-09-03 | End: 2023-09-03

## 2023-09-03 RX ORDER — HALOPERIDOL 5 MG/ML
5 INJECTION INTRAMUSCULAR ONCE AS NEEDED
Status: COMPLETED | OUTPATIENT
Start: 2023-09-03 | End: 2023-09-03

## 2023-09-03 RX ADMIN — HALOPERIDOL LACTATE 5 MG: 5 INJECTION, SOLUTION INTRAMUSCULAR at 03:09

## 2023-09-03 RX ADMIN — LORAZEPAM 2 MG: 2 INJECTION INTRAMUSCULAR at 03:09

## 2023-09-03 SDOH — SOCIAL DETERMINANTS OF HEALTH (SDOH): HOMELESSNESS UNSPECIFIED: Z59.00

## 2023-09-03 NOTE — ED NOTES
1:1 sitter continued. Pt continues to walk around room and loudly exclaim wants intermittently. Otherwise compliant with care. Sandwiches and drink provided.

## 2023-09-03 NOTE — ED NOTES
1:1 sitter initiated. Pt in paper scrubs and all personal belongings collected by sitter. Pt anxious, but cooperative with care at this time.

## 2023-09-03 NOTE — ED PROVIDER NOTES
"Encounter Date: 9/3/2023       History     Chief Complaint   Patient presents with    Mental Health Problem     Brought in chip [facility] Delusional, paranoia. +heroin use. Noncompliant with meds.      27-year-old male with PMH of schizoaffective disorder and polysubstance abuse presents via the [facility]'s office for psychiatric evaluation.  Patient does not remember the events that led to him being in the ED and denies any physical complaints at this time.  History is limited due to the patient's mental status, as his story changes [id number].  He puts denies drug use and reports using cocaine today.  He states he needs to go to court in the next few weeks to pay a fine. He's unable to provide a comprehensive history. Reports he needs to go to [facility] "for rehabilitation." Denies SI and HI. [facility] reported the patient was paranoid and delusional.     The history is provided by the patient and the police. The history is limited by the condition of the patient.     Review of patient's allergies indicates:   Allergen Reactions    Thorazine [chlorpromazine]      "get overheated when I go outside after taking"     Past Medical History:   Diagnosis Date    Bipolar 1 disorder     Hepatitis C     History of psychiatric care     History of psychiatric hospitalization     Psychiatric exam requested by authority     Psychiatric problem     Schizo affective schizophrenia     Therapy      Past Surgical History:   Procedure Laterality Date    ADENOIDECTOMY      EYE SURGERY      KNEE SURGERY      TONSILLECTOMY       Family History   Problem Relation Age of Onset    Heart disease Mother     Diabetes Maternal Aunt      Social History     Tobacco Use    Smoking status: Every Day     Current packs/day: 0.25     Average packs/day: 0.3 packs/day for 3.0 years (0.8 ttl pk-yrs)     Types: Cigarettes   Substance Use Topics    Alcohol use: No    Drug use: Yes     Types: Marijuana, Cocaine     Comment: heroin "     Review of Systems    Physical Exam     Initial Vitals   BP Pulse Resp Temp SpO2   09/03/23 1438 09/03/23 1438 09/03/23 1431 09/03/23 1438 09/03/23 1438   125/82 88 16 99.2 °F (37.3 °C) 98 %      MAP       --                Physical Exam    Nursing note and vitals reviewed.  Constitutional: He appears well-developed and well-nourished. He is not diaphoretic.   HENT:   Head: Normocephalic and atraumatic.   Eyes: Conjunctivae and EOM are normal. Pupils are equal, round, and reactive to light.   Neck: Neck supple.   Normal range of motion.  Cardiovascular:  Normal rate, regular rhythm, normal heart sounds and intact distal pulses.           No murmur heard.  Pulmonary/Chest: Breath sounds normal. No respiratory distress. He has no wheezes. He has no rhonchi. He has no rales.   Abdominal: Abdomen is soft. He exhibits no distension. There is no abdominal tenderness. There is no rebound and no guarding.   Musculoskeletal:         General: No tenderness or edema.      Cervical back: Normal range of motion and neck supple.     Neurological: He is alert and oriented to person, place, and time.   Skin: Skin is warm and dry. Capillary refill takes less than 2 seconds.   Psychiatric:   Anxious mood/affect. Speech is tangential without linear thoughts. Denies active SI/HI. Unable to directly answer most questions.          ED Course   Procedures  Labs Reviewed   CBC W/ AUTO DIFFERENTIAL - Abnormal; Notable for the following components:       Result Value    Hemoglobin 12.6 (*)     Hematocrit 38.9 (*)     MCV 79 (*)     MCH 25.6 (*)     All other components within normal limits   COMPREHENSIVE METABOLIC PANEL - Abnormal; Notable for the following components:    Alkaline Phosphatase 51 (*)     ALT 63 (*)     All other components within normal limits   URINALYSIS, REFLEX TO URINE CULTURE - Abnormal; Notable for the following components:    Specific Gravity, UA >1.030 (*)     Protein, UA 1+ (*)     Ketones, UA Trace (*)     All  "other components within normal limits    Narrative:     Specimen Source->Urine   DRUG SCREEN PANEL, URINE EMERGENCY - Abnormal; Notable for the following components:    Cocaine (Metab.) Presumptive Positive (*)     Amphetamine Screen, Ur Presumptive Positive (*)     THC Presumptive Positive (*)     All other components within normal limits    Narrative:     Specimen Source->Urine   ACETAMINOPHEN LEVEL - Abnormal; Notable for the following components:    Acetaminophen (Tylenol), Serum <3.0 (*)     All other components within normal limits   TSH   ALCOHOL,MEDICAL (ETHANOL)   URINALYSIS MICROSCOPIC    Narrative:     Specimen Source->Urine          Imaging Results    None          Medications   haloperidol lactate injection 5 mg (5 mg Intramuscular Given by Other 9/3/23 1529)   LORazepam injection 2 mg (2 mg Intramuscular Given by Other 9/3/23 1530)     Medical Decision Making  Pt PEC'd for grave disability and will be medically cleared and transferred to a psychiatric facility.     Ddx: polysubstance abuse, paranoid delusions, acute psychosis    Amount and/or Complexity of Data Reviewed  Independent Historian:      Details: Police- see HPI  Labs: ordered. Decision-making details documented in ED Course.    Risk  Prescription drug management.  Decision regarding hospitalization.  Diagnosis or treatment significantly limited by social determinants of health.               ED Course as of 09/03/23 1918   Sun Sep 03, 2023   1525 Patient becoming slightly agitated and asking the tech to "save that woman and put some oxygen on her". He attempted to leave and took staff to redirect him. Will administer the PRNs [BD]   1817 TSH: 1.817  Normal [BD]   1817 Acetaminophen (Tylenol), Serum(!): <3.0 [BD]   1817 Alcohol, Serum: <10 [BD]   1817 CBC auto differential(!)  CBC unremarkable without leukocytosis, significant anemia, or decreased platelets   [BD]   1818 Comprehensive metabolic panel(!)  CMP unremarkable without significant " electrolyte derangement, impaired renal function, or significantly elevated LFTs [BD]   1842 Drug screen panel, emergency(!)  UDS positive for cocaine, amphetamines, and marijuana [BD]   1917 Urinalysis, Reflex to Urine Culture Urine, Clean Catch(!)  UA unremarkable without evidence of infection or hematuria   [BD]   1917 Patient has been medically cleared at this time and will be transferred to psychiatric facility upon placement. [BD]      ED Course User Index  [BD] Jose Porras MD         Medically cleared for psychiatry placement: 9/3/2023  7:18 PM            Clinical Impression:   Final diagnoses:  [Z00.8] Medical clearance for psychiatric admission (Primary)  [F19.10] Polysubstance abuse  [Z59.00] Homelessness        ED Disposition Condition    Transfer to Psych Facility Stable          ED Prescriptions    None       Follow-up Information    None          Jose Porras MD  09/03/23 1918

## 2023-09-03 NOTE — ED NOTES
Pt belongings placed in closet:  Shorts  Shoes  Shirt    Pt belongings placed in safe:  Cellphone  Cellphone   Wrist watch  Wallet w/ $2 cash, personal ID cards  $1.73 in change

## 2023-09-03 NOTE — ED NOTES
"Zack LISBETH Bhatia, a 27 y.o. male [place] [facility]  [place] signs of delusional and paranoia. +heroin use. Noncompliant with meds. AAOx3. Psych hx. 1:1 sitter collected personal belongings and placed pt in paper scrubs.     Triage note:  Chief Complaint   Patient presents with    Mental Health Problem     Brought in bu [facility] Delusional, paranoia. +heroin use. Noncompliant with meds.      Review of patient's allergies indicates:   Allergen Reactions    Thorazine [chlorpromazine]      "get overheated when I go outside after taking"     Past Medical History:   Diagnosis Date    Bipolar 1 disorder     Hepatitis C     History of psychiatric care     History of psychiatric hospitalization     Psychiatric exam requested by authority     Psychiatric problem     Schizo affective schizophrenia     Therapy       "

## 2023-09-03 NOTE — ED NOTES
Sitter responsibilities taken over by jeronimo harrell. Pt changed into paper scrubs as per hospital policy. Pt walking around room after changing restlessly. Bed locked and in lowest position. Pt belongings confiscated and locked in safe/closet as per hospital policy. Pt has no q/c/c @ this time.

## 2023-09-03 NOTE — ED NOTES
Pt asking to 'save that woman. That police. Put some oxygen on her'. When asked whom he was referring to he said the lady outside the room.

## 2023-09-04 VITALS
HEIGHT: 69 IN | SYSTOLIC BLOOD PRESSURE: 133 MMHG | OXYGEN SATURATION: 96 % | TEMPERATURE: 98 F | WEIGHT: 198 LBS | DIASTOLIC BLOOD PRESSURE: 80 MMHG | HEART RATE: 84 BPM | BODY MASS INDEX: 29.33 KG/M2 | RESPIRATION RATE: 18 BRPM

## 2023-09-04 PROBLEM — Z13.9 ENCOUNTER FOR MEDICAL SCREENING EXAMINATION: Status: RESOLVED | Noted: 2023-05-31 | Resolved: 2023-09-04

## 2023-09-04 NOTE — ED NOTES
Attempted to call patient report to [facility], sutomated messaged stated no one is able to answer the phone and the call was ended.

## 2023-09-04 NOTE — ED NOTES
Attempted to call report for patient to [facility], called [phone]. Phone automated messaged stated no one was able to answer the phone and the call ended.

## 2023-09-04 NOTE — ED NOTES
SPD stated that they did not have their trip sheet form, informed [employer] that we did not have this form. [employer] stated that they would have to cancel the trip. Called the transfer center to inform them of the situation.

## 2023-09-04 NOTE — ED NOTES
Attempted to call patient report to [facility], automated messaged stated no one was able to answer the phone and the call was ended.

## 2023-09-04 NOTE — ED NOTES
1:1 sitter continued. Pt getting up intermittent, but is easily redirected at this time. Pt verbally abusive towards sitter, but easily redirected at this time.

## 2023-09-20 ENCOUNTER — HOSPITAL ENCOUNTER (EMERGENCY)
Facility: HOSPITAL | Age: 28
Discharge: HOME | End: 2023-09-20
Attending: EMERGENCY MEDICINE
Payer: MEDICAID

## 2023-09-20 VITALS
SYSTOLIC BLOOD PRESSURE: 133 MMHG | RESPIRATION RATE: 16 BRPM | HEART RATE: 60 BPM | DIASTOLIC BLOOD PRESSURE: 73 MMHG | OXYGEN SATURATION: 99 % | TEMPERATURE: 98 F

## 2023-09-20 DIAGNOSIS — F14.10 COCAINE ABUSE: ICD-10-CM

## 2023-09-20 DIAGNOSIS — Z00.8 MEDICAL CLEARANCE FOR PSYCHIATRIC ADMISSION: Primary | ICD-10-CM

## 2023-09-20 DIAGNOSIS — F19.94 SUBSTANCE INDUCED MOOD DISORDER: ICD-10-CM

## 2023-09-20 LAB
ALBUMIN SERPL BCP-MCNC: 3.3 G/DL (ref 3.5–5.2)
ALP SERPL-CCNC: 43 U/L (ref 55–135)
ALT SERPL W/O P-5'-P-CCNC: 31 U/L (ref 10–44)
AMPHET+METHAMPHET UR QL: ABNORMAL
ANION GAP SERPL CALC-SCNC: 7 MMOL/L (ref 8–16)
APAP SERPL-MCNC: <3 UG/ML (ref 10–20)
AST SERPL-CCNC: 21 U/L (ref 10–40)
BARBITURATES UR QL SCN>200 NG/ML: NEGATIVE
BASOPHILS # BLD AUTO: 0.05 K/UL (ref 0–0.2)
BASOPHILS NFR BLD: 0.3 % (ref 0–1.9)
BENZODIAZ UR QL SCN>200 NG/ML: NEGATIVE
BILIRUB SERPL-MCNC: 0.3 MG/DL (ref 0.1–1)
BILIRUB UR QL STRIP: NEGATIVE
BUN SERPL-MCNC: 10 MG/DL (ref 6–20)
BZE UR QL SCN: ABNORMAL
CALCIUM SERPL-MCNC: 8.8 MG/DL (ref 8.7–10.5)
CANNABINOIDS UR QL SCN: ABNORMAL
CHLORIDE SERPL-SCNC: 110 MMOL/L (ref 95–110)
CLARITY UR REFRACT.AUTO: CLEAR
CO2 SERPL-SCNC: 25 MMOL/L (ref 23–29)
COLOR UR AUTO: YELLOW
CREAT SERPL-MCNC: 0.8 MG/DL (ref 0.5–1.4)
CREAT UR-MCNC: 291 MG/DL (ref 23–375)
DIFFERENTIAL METHOD: ABNORMAL
EOSINOPHIL # BLD AUTO: 0.2 K/UL (ref 0–0.5)
EOSINOPHIL NFR BLD: 1.1 % (ref 0–8)
ERYTHROCYTE [DISTWIDTH] IN BLOOD BY AUTOMATED COUNT: 14.2 % (ref 11.5–14.5)
EST. GFR  (NO RACE VARIABLE): >60 ML/MIN/1.73 M^2
ETHANOL SERPL-MCNC: <10 MG/DL
GLUCOSE SERPL-MCNC: 99 MG/DL (ref 70–110)
GLUCOSE UR QL STRIP: NEGATIVE
HCT VFR BLD AUTO: 36.5 % (ref 40–54)
HGB BLD-MCNC: 11.8 G/DL (ref 14–18)
HGB UR QL STRIP: NEGATIVE
IMM GRANULOCYTES # BLD AUTO: 0.05 K/UL (ref 0–0.04)
IMM GRANULOCYTES NFR BLD AUTO: 0.3 % (ref 0–0.5)
KETONES UR QL STRIP: NEGATIVE
LEUKOCYTE ESTERASE UR QL STRIP: NEGATIVE
LYMPHOCYTES # BLD AUTO: 4.3 K/UL (ref 1–4.8)
LYMPHOCYTES NFR BLD: 28.7 % (ref 18–48)
MCH RBC QN AUTO: 26.3 PG (ref 27–31)
MCHC RBC AUTO-ENTMCNC: 32.3 G/DL (ref 32–36)
MCV RBC AUTO: 82 FL (ref 82–98)
METHADONE UR QL SCN>300 NG/ML: NEGATIVE
MONOCYTES # BLD AUTO: 1.2 K/UL (ref 0.3–1)
MONOCYTES NFR BLD: 8 % (ref 4–15)
NEUTROPHILS # BLD AUTO: 9.2 K/UL (ref 1.8–7.7)
NEUTROPHILS NFR BLD: 61.6 % (ref 38–73)
NITRITE UR QL STRIP: NEGATIVE
NRBC BLD-RTO: 0 /100 WBC
OPIATES UR QL SCN: NEGATIVE
PCP UR QL SCN>25 NG/ML: NEGATIVE
PH UR STRIP: 6 [PH] (ref 5–8)
PLATELET # BLD AUTO: 200 K/UL (ref 150–450)
PLATELET BLD QL SMEAR: ABNORMAL
PMV BLD AUTO: 12.4 FL (ref 9.2–12.9)
POTASSIUM SERPL-SCNC: 3.6 MMOL/L (ref 3.5–5.1)
PROT SERPL-MCNC: 5.9 G/DL (ref 6–8.4)
PROT UR QL STRIP: ABNORMAL
RBC # BLD AUTO: 4.48 M/UL (ref 4.6–6.2)
SCHISTOCYTES BLD QL SMEAR: ABNORMAL
SODIUM SERPL-SCNC: 142 MMOL/L (ref 136–145)
SP GR UR STRIP: >1.03 (ref 1–1.03)
TOXICOLOGY INFORMATION: ABNORMAL
TSH SERPL DL<=0.005 MIU/L-ACNC: 1.03 UIU/ML (ref 0.4–4)
URN SPEC COLLECT METH UR: ABNORMAL
WBC # BLD AUTO: 14.88 K/UL (ref 3.9–12.7)

## 2023-09-20 PROCEDURE — 85025 COMPLETE CBC W/AUTO DIFF WBC: CPT | Performed by: EMERGENCY MEDICINE

## 2023-09-20 PROCEDURE — 82077 ASSAY SPEC XCP UR&BREATH IA: CPT | Performed by: EMERGENCY MEDICINE

## 2023-09-20 PROCEDURE — 84443 ASSAY THYROID STIM HORMONE: CPT | Performed by: EMERGENCY MEDICINE

## 2023-09-20 PROCEDURE — 81003 URINALYSIS AUTO W/O SCOPE: CPT | Mod: 59 | Performed by: EMERGENCY MEDICINE

## 2023-09-20 PROCEDURE — 80143 DRUG ASSAY ACETAMINOPHEN: CPT | Performed by: EMERGENCY MEDICINE

## 2023-09-20 PROCEDURE — 80307 DRUG TEST PRSMV CHEM ANLYZR: CPT | Performed by: EMERGENCY MEDICINE

## 2023-09-20 PROCEDURE — 99285 EMERGENCY DEPT VISIT HI MDM: CPT

## 2023-09-20 PROCEDURE — 80053 COMPREHEN METABOLIC PANEL: CPT | Performed by: EMERGENCY MEDICINE

## 2023-09-20 NOTE — ED NOTES
"Patient brought in by [facility] [place] [facility] with OPC from mother. Patient has been threatening his mom and she states he has not been taking his medication. Patient states he does currently use herion and last time he used was yesterday. Patient states "I just need help, I want to go to rehab." Patient undressed and all belongings removed and patient placed in paper scrubs. Sitter at bedside doing q 15 min checks, no acute distress noted, will continue to monitor.   "

## 2023-09-20 NOTE — ED PROVIDER NOTES
"Encounter Date: 9/20/2023       History     Chief Complaint   Patient presents with    Suicidal    Homicidal     Brought by [facility] for suicidal and homicidal ideation against mother. Endorses heroin use.      Patient is a 27-year-old male with past medical history of bipolar disorder, hepatitis-C who presents brought in by [facility] for suicidal and homicidal ideation. Pt tells me he has been snorting and smoking crack cocaine, and he is upset with his parents because he wants them to help him out. He states they are unhappy with his drug use. Pt is reportedly not taking any of his psych medications    I attempted to call patient's mother for collateral x 1 no answer at 19:02    The history is provided by the patient and medical records. The history is limited by the condition of the patient.     Review of patient's allergies indicates:   Allergen Reactions    Thorazine [chlorpromazine]      "get overheated when I go outside after taking"     Past Medical History:   Diagnosis Date    Bipolar 1 disorder     Hepatitis C     History of psychiatric care     History of psychiatric hospitalization     Psychiatric exam requested by authority     Psychiatric problem     Schizo affective schizophrenia     Therapy      Past Surgical History:   Procedure Laterality Date    ADENOIDECTOMY      EYE SURGERY      KNEE SURGERY      TONSILLECTOMY       Family History   Problem Relation Age of Onset    Heart disease Mother     Diabetes Maternal Aunt      Social History     Tobacco Use    Smoking status: Every Day     Current packs/day: 0.25     Average packs/day: 0.3 packs/day for 3.0 years (0.8 ttl pk-yrs)     Types: Cigarettes   Substance Use Topics    Alcohol use: No    Drug use: Yes     Types: Marijuana, Cocaine     Comment: heroin         Physical Exam     Initial Vitals   BP Pulse Resp Temp SpO2   09/20/23 1643 09/20/23 1643 09/20/23 1721 09/20/23 1643 09/20/23 1643   114/77 78 16 97.6 °F (36.4 °C) 96 %      MAP       --            "     Physical Exam    Nursing note and vitals reviewed.  Constitutional: He appears well-developed and well-nourished. He is not diaphoretic. No distress.   HENT:   Head: Normocephalic and atraumatic.   Eyes: EOM are normal.   Neck: Neck supple.   Normal range of motion.  Cardiovascular:  Normal rate and regular rhythm.           Pulmonary/Chest: No respiratory distress.   Abdominal: He exhibits no distension.   Musculoskeletal:         General: Normal range of motion.      Cervical back: Normal range of motion and neck supple.     Neurological: He is alert and oriented to person, place, and time. GCS score is 15. GCS eye subscore is 4. GCS verbal subscore is 5. GCS motor subscore is 6.   Skin: Skin is warm and dry.   Psychiatric:   Calm, cooperative  Currently denying SI or HI         ED Course   Procedures  Labs Reviewed   CBC W/ AUTO DIFFERENTIAL - Abnormal; Notable for the following components:       Result Value    WBC 14.88 (*)     RBC 4.48 (*)     Hemoglobin 11.8 (*)     Hematocrit 36.5 (*)     MCH 26.3 (*)     Gran # (ANC) 9.2 (*)     Immature Grans (Abs) 0.05 (*)     Mono # 1.2 (*)     All other components within normal limits   COMPREHENSIVE METABOLIC PANEL - Abnormal; Notable for the following components:    Total Protein 5.9 (*)     Albumin 3.3 (*)     Alkaline Phosphatase 43 (*)     Anion Gap 7 (*)     All other components within normal limits   URINALYSIS, REFLEX TO URINE CULTURE - Abnormal; Notable for the following components:    Specific Gravity, UA >1.030 (*)     Protein, UA Trace (*)     All other components within normal limits    Narrative:     Specimen Source->Urine   DRUG SCREEN PANEL, URINE EMERGENCY - Abnormal; Notable for the following components:    Cocaine (Metab.) Presumptive Positive (*)     Amphetamine Screen, Ur Presumptive Positive (*)     THC Presumptive Positive (*)     All other components within normal limits    Narrative:     Specimen Source->Urine   ACETAMINOPHEN LEVEL -  Abnormal; Notable for the following components:    Acetaminophen (Tylenol), Serum <3.0 (*)     All other components within normal limits   TSH   ALCOHOL,MEDICAL (ETHANOL)          Imaging Results    None          Medications - No data to display  Medical Decision Making  Per report pt was threatening himself and others  Admits to cocaine use  Drug screen is positive for mult things including cocaine, amphetamines  Labs with leukocytosis but patient without infectious symptoms  Patient is medically cleared for psych transfer    Amount and/or Complexity of Data Reviewed  Labs: ordered.               ED Course as of 09/22/23 1320   Wed Sep 20, 2023   1902 Pos for cocaine, amphetamines, marijuana [GK]   1913 I spoke with pt's mother. Pt is homeless and mother has a restraining order against the patient but they aren't always following it. The patient is calling and has been by the house.  Pt has a history of schizoaffective disorder and drug use. Does not take his medications regularly.  Mother was driving him to court yesterday and pt just started taking different prescribed pills but not as he is prescribed them. Pt was just released from [facility] last week and was supposed to go to rehab from there. [GK]      ED Course User Index  [GK] Liza Fitzgerald MD       Medically cleared for psychiatry placement: 9/20/2023  7:10 PM            Clinical Impression:   Final diagnoses:  [Z00.8] Medical clearance for psychiatric admission (Primary)  [F19.94] Substance induced mood disorder  [F14.10] Cocaine abuse        ED Disposition Condition    Transfer to Psych Facility Stable          ED Prescriptions    None       Follow-up Information    None          Liza Fitzgerald MD  09/20/23 1910       Liza Fitzgerald MD  09/22/23 1320

## 2023-09-21 NOTE — ED NOTES
"Resumed care of patient.   Zack Bhatia, a 27 y.o. male presents to the ED w/ complaint of feeling suicidal and having homicidal ideation towards his family. Patient is resting comfortably at the moment, sitter at bedside . Alert x 4.     Triage note:  Chief Complaint   Patient presents with    Suicidal    Homicidal     Brought by [facility] for suicidal and homicidal ideation against mother. Endorses heroin use.      Review of patient's allergies indicates:   Allergen Reactions    Thorazine [chlorpromazine]      "get overheated when I go outside after taking"     Past Medical History:   Diagnosis Date    Bipolar 1 disorder     Hepatitis C     History of psychiatric care     History of psychiatric hospitalization     Psychiatric exam requested by authority     Psychiatric problem     Schizo affective schizophrenia     Therapy        "

## 2023-12-25 PROBLEM — Z13.9 ENCOUNTER FOR MEDICAL SCREENING EXAMINATION: Status: RESOLVED | Noted: 2023-05-31 | Resolved: 2023-12-25
